# Patient Record
Sex: FEMALE | Race: WHITE | URBAN - METROPOLITAN AREA
[De-identification: names, ages, dates, MRNs, and addresses within clinical notes are randomized per-mention and may not be internally consistent; named-entity substitution may affect disease eponyms.]

---

## 2017-11-07 ENCOUNTER — HOSPITAL ENCOUNTER (OUTPATIENT)
Dept: ULTRASOUND IMAGING | Facility: CLINIC | Age: 67
Discharge: HOME | End: 2017-11-07
Attending: ORTHOPAEDIC SURGERY | Admitting: ORTHOPAEDIC SURGERY
Payer: MEDICARE

## 2017-11-07 DIAGNOSIS — M79.661 PAIN OF RIGHT LOWER LEG: ICD-10-CM

## 2017-11-07 PROCEDURE — 93971 EXTREMITY STUDY: CPT | Mod: RT

## 2024-12-30 ENCOUNTER — TRANSFERRED RECORDS (OUTPATIENT)
Dept: HEALTH INFORMATION MANAGEMENT | Facility: CLINIC | Age: 74
End: 2024-12-30

## 2025-04-07 ENCOUNTER — TRANSFERRED RECORDS (OUTPATIENT)
Dept: MULTI SPECIALTY CLINIC | Facility: CLINIC | Age: 75
End: 2025-04-07
Payer: MEDICARE

## 2025-04-07 LAB
CREATININE (EXTERNAL): 1.2 MG/DL (ref 0.5–1.5)
GLUCOSE (EXTERNAL): 97 MG/DL (ref 60–115)
POTASSIUM (EXTERNAL): 5 MMOL/L (ref 3.6–5.1)

## 2025-04-15 RX ORDER — GABAPENTIN 600 MG/1
600 TABLET ORAL 2 TIMES DAILY
COMMUNITY

## 2025-04-15 RX ORDER — LISINOPRIL 2.5 MG/1
2.5 TABLET ORAL DAILY
COMMUNITY

## 2025-04-15 RX ORDER — LETROZOLE 2.5 MG/1
2.5 TABLET, FILM COATED ORAL DAILY
COMMUNITY

## 2025-04-15 RX ORDER — SIMVASTATIN 20 MG
20 TABLET ORAL AT BEDTIME
COMMUNITY

## 2025-04-15 RX ORDER — ROPINIROLE 0.25 MG/1
0.25 TABLET, FILM COATED ORAL AT BEDTIME
COMMUNITY

## 2025-04-15 NOTE — PROVIDER NOTIFICATION
04/15/25 3615   Discharge Planning   Patient/Family Anticipates Transition to home   Concerns to be Addressed denies needs/concerns at this time   Living Arrangements   People in Home alone   Type of Residence Private Residence   Is your private residence a single family home or apartment? Apartment   Number of Stairs, Within Home, Primary none   Stair Railings, Within Home, Primary none   Once home, are you able to live on one level? Yes   Which level? Main Level   Bathroom Shower/Tub Walk-in shower   Equipment Currently Used at Home walker, rolling;grab bar, tub/shower;grab bar, toilet;cane, straight;shower chair   Support System   Support Systems Children   Do you have someone available to stay with you one or two nights once you are home? Yes   Blood   Known Bleeding Disorder or Coagulopathy No   Does the patient have any Yazidi/cultural preferences related to blood products? No   Education   Has the patient scheduled or completed pre-op total joint education, either in class or online, in the last 12 months? Yes   What day did the patient complete, or plan to complete, pre-op total joint education? 04/14/25   Patient attended total joint pre-op class/received pre-op teaching  online     Rachael Marte RN on 4/15/2025 at 3:58 PM

## 2025-05-06 ENCOUNTER — HOSPITAL ENCOUNTER (OUTPATIENT)
Facility: CLINIC | Age: 75
Discharge: HOME | End: 2025-05-07
Attending: ORTHOPAEDIC SURGERY | Admitting: ORTHOPAEDIC SURGERY
Payer: MEDICARE

## 2025-05-06 ENCOUNTER — APPOINTMENT (OUTPATIENT)
Dept: GENERAL RADIOLOGY | Facility: CLINIC | Age: 75
End: 2025-05-06
Attending: ORTHOPAEDIC SURGERY
Payer: MEDICARE

## 2025-05-06 ENCOUNTER — ANESTHESIA EVENT (OUTPATIENT)
Dept: SURGERY | Facility: CLINIC | Age: 75
End: 2025-05-06
Payer: MEDICARE

## 2025-05-06 ENCOUNTER — APPOINTMENT (OUTPATIENT)
Dept: PHYSICAL THERAPY | Facility: CLINIC | Age: 75
End: 2025-05-06
Attending: ORTHOPAEDIC SURGERY
Payer: MEDICARE

## 2025-05-06 ENCOUNTER — ANESTHESIA (OUTPATIENT)
Dept: SURGERY | Facility: CLINIC | Age: 75
End: 2025-05-06
Payer: MEDICARE

## 2025-05-06 DIAGNOSIS — Z96.652 S/P TKR (TOTAL KNEE REPLACEMENT), LEFT: Primary | ICD-10-CM

## 2025-05-06 DIAGNOSIS — M17.12 PRIMARY OSTEOARTHRITIS OF LEFT KNEE: ICD-10-CM

## 2025-05-06 PROCEDURE — 250N000009 HC RX 250: Performed by: ORTHOPAEDIC SURGERY

## 2025-05-06 PROCEDURE — 999N000141 HC STATISTIC PRE-PROCEDURE NURSING ASSESSMENT: Performed by: ORTHOPAEDIC SURGERY

## 2025-05-06 PROCEDURE — 272N000001 HC OR GENERAL SUPPLY STERILE: Performed by: ORTHOPAEDIC SURGERY

## 2025-05-06 PROCEDURE — 250N000009 HC RX 250: Performed by: NURSE ANESTHETIST, CERTIFIED REGISTERED

## 2025-05-06 PROCEDURE — 97116 GAIT TRAINING THERAPY: CPT | Mod: GP

## 2025-05-06 PROCEDURE — 258N000001 HC RX 258: Performed by: ORTHOPAEDIC SURGERY

## 2025-05-06 PROCEDURE — 258N000003 HC RX IP 258 OP 636: Performed by: PHYSICIAN ASSISTANT

## 2025-05-06 PROCEDURE — 258N000003 HC RX IP 258 OP 636: Performed by: NURSE ANESTHETIST, CERTIFIED REGISTERED

## 2025-05-06 PROCEDURE — 250N000013 HC RX MED GY IP 250 OP 250 PS 637: Performed by: PHYSICIAN ASSISTANT

## 2025-05-06 PROCEDURE — 370N000017 HC ANESTHESIA TECHNICAL FEE, PER MIN: Performed by: ORTHOPAEDIC SURGERY

## 2025-05-06 PROCEDURE — 250N000013 HC RX MED GY IP 250 OP 250 PS 637: Performed by: ORTHOPAEDIC SURGERY

## 2025-05-06 PROCEDURE — 360N000077 HC SURGERY LEVEL 4, PER MIN: Performed by: ORTHOPAEDIC SURGERY

## 2025-05-06 PROCEDURE — 710N000009 HC RECOVERY PHASE 1, LEVEL 1, PER MIN: Performed by: ORTHOPAEDIC SURGERY

## 2025-05-06 PROCEDURE — C1713 ANCHOR/SCREW BN/BN,TIS/BN: HCPCS | Performed by: ORTHOPAEDIC SURGERY

## 2025-05-06 PROCEDURE — C1776 JOINT DEVICE (IMPLANTABLE): HCPCS | Performed by: ORTHOPAEDIC SURGERY

## 2025-05-06 PROCEDURE — 250N000009 HC RX 250: Performed by: ANESTHESIOLOGY

## 2025-05-06 PROCEDURE — 97161 PT EVAL LOW COMPLEX 20 MIN: CPT | Mod: GP

## 2025-05-06 PROCEDURE — 250N000011 HC RX IP 250 OP 636: Performed by: NURSE ANESTHETIST, CERTIFIED REGISTERED

## 2025-05-06 PROCEDURE — 97110 THERAPEUTIC EXERCISES: CPT | Mod: GP

## 2025-05-06 PROCEDURE — 250N000011 HC RX IP 250 OP 636: Performed by: ANESTHESIOLOGY

## 2025-05-06 PROCEDURE — 120N000001 HC R&B MED SURG/OB

## 2025-05-06 PROCEDURE — 271N000001 HC OR GENERAL SUPPLY NON-STERILE: Performed by: ORTHOPAEDIC SURGERY

## 2025-05-06 PROCEDURE — 258N000003 HC RX IP 258 OP 636: Performed by: ORTHOPAEDIC SURGERY

## 2025-05-06 PROCEDURE — 250N000011 HC RX IP 250 OP 636: Performed by: ORTHOPAEDIC SURGERY

## 2025-05-06 PROCEDURE — 250N000011 HC RX IP 250 OP 636: Mod: JZ | Performed by: PHYSICIAN ASSISTANT

## 2025-05-06 PROCEDURE — 250N000011 HC RX IP 250 OP 636: Performed by: PHYSICIAN ASSISTANT

## 2025-05-06 PROCEDURE — 250N000025 HC SEVOFLURANE, PER MIN: Performed by: ORTHOPAEDIC SURGERY

## 2025-05-06 PROCEDURE — 99207 PR NO BILLABLE SERVICE THIS VISIT: CPT | Performed by: PHYSICIAN ASSISTANT

## 2025-05-06 PROCEDURE — 999N000065 XR KNEE PORT LEFT 1/2 VIEWS: Mod: LT

## 2025-05-06 DEVICE — FULL DOSE BONE CEMENT, 10 PACK CATALOG NUMBER IS 6191-1-010
Type: IMPLANTABLE DEVICE | Site: KNEE | Status: FUNCTIONAL
Brand: SIMPLEX

## 2025-05-06 DEVICE — PATELLA
Type: IMPLANTABLE DEVICE | Site: KNEE | Status: FUNCTIONAL
Brand: TRIATHLON

## 2025-05-06 DEVICE — UNIVERSAL TIBIAL BASEPLATE
Type: IMPLANTABLE DEVICE | Site: KNEE | Status: FUNCTIONAL
Brand: TRIATHLON

## 2025-05-06 DEVICE — POSTERIOR STABILIZED FEMORAL
Type: IMPLANTABLE DEVICE | Site: KNEE | Status: FUNCTIONAL
Brand: TRIATHLON

## 2025-05-06 DEVICE — TIBIAL BEARING INSERT - PS
Type: IMPLANTABLE DEVICE | Site: KNEE | Status: FUNCTIONAL
Brand: TRIATHLON

## 2025-05-06 RX ORDER — HYDRALAZINE HYDROCHLORIDE 20 MG/ML
INJECTION INTRAMUSCULAR; INTRAVENOUS PRN
Status: DISCONTINUED | OUTPATIENT
Start: 2025-05-06 | End: 2025-05-06

## 2025-05-06 RX ORDER — AMOXICILLIN 250 MG
1-2 CAPSULE ORAL 2 TIMES DAILY
Qty: 30 TABLET | Refills: 0 | Status: SHIPPED | OUTPATIENT
Start: 2025-05-06

## 2025-05-06 RX ORDER — ONDANSETRON 2 MG/ML
4 INJECTION INTRAMUSCULAR; INTRAVENOUS EVERY 30 MIN PRN
Status: DISCONTINUED | OUTPATIENT
Start: 2025-05-06 | End: 2025-05-06 | Stop reason: HOSPADM

## 2025-05-06 RX ORDER — ACETAMINOPHEN 325 MG/1
650 TABLET ORAL EVERY 4 HOURS PRN
Qty: 100 TABLET | Refills: 0 | Status: SHIPPED | OUTPATIENT
Start: 2025-05-06 | End: 2025-05-07

## 2025-05-06 RX ORDER — BISACODYL 10 MG
10 SUPPOSITORY, RECTAL RECTAL DAILY PRN
Status: DISCONTINUED | OUTPATIENT
Start: 2025-05-09 | End: 2025-05-07 | Stop reason: HOSPADM

## 2025-05-06 RX ORDER — CEFAZOLIN SODIUM/WATER 2 G/20 ML
2 SYRINGE (ML) INTRAVENOUS
Status: COMPLETED | OUTPATIENT
Start: 2025-05-06 | End: 2025-05-06

## 2025-05-06 RX ORDER — ROPINIROLE 0.25 MG/1
0.25 TABLET, FILM COATED ORAL AT BEDTIME
Status: DISCONTINUED | OUTPATIENT
Start: 2025-05-06 | End: 2025-05-07 | Stop reason: HOSPADM

## 2025-05-06 RX ORDER — LIDOCAINE HYDROCHLORIDE 20 MG/ML
INJECTION, SOLUTION INFILTRATION; PERINEURAL PRN
Status: DISCONTINUED | OUTPATIENT
Start: 2025-05-06 | End: 2025-05-06

## 2025-05-06 RX ORDER — CEFAZOLIN SODIUM/WATER 2 G/20 ML
2 SYRINGE (ML) INTRAVENOUS SEE ADMIN INSTRUCTIONS
Status: DISCONTINUED | OUTPATIENT
Start: 2025-05-06 | End: 2025-05-06 | Stop reason: HOSPADM

## 2025-05-06 RX ORDER — NALOXONE HYDROCHLORIDE 0.4 MG/ML
0.2 INJECTION, SOLUTION INTRAMUSCULAR; INTRAVENOUS; SUBCUTANEOUS
Status: DISCONTINUED | OUTPATIENT
Start: 2025-05-06 | End: 2025-05-07 | Stop reason: HOSPADM

## 2025-05-06 RX ORDER — SODIUM CHLORIDE, SODIUM LACTATE, POTASSIUM CHLORIDE, CALCIUM CHLORIDE 600; 310; 30; 20 MG/100ML; MG/100ML; MG/100ML; MG/100ML
INJECTION, SOLUTION INTRAVENOUS CONTINUOUS
Status: DISCONTINUED | OUTPATIENT
Start: 2025-05-06 | End: 2025-05-07 | Stop reason: HOSPADM

## 2025-05-06 RX ORDER — ONDANSETRON 2 MG/ML
4 INJECTION INTRAMUSCULAR; INTRAVENOUS EVERY 6 HOURS PRN
Status: DISCONTINUED | OUTPATIENT
Start: 2025-05-06 | End: 2025-05-07 | Stop reason: HOSPADM

## 2025-05-06 RX ORDER — ACETAMINOPHEN 325 MG/1
975 TABLET ORAL EVERY 8 HOURS
Status: DISCONTINUED | OUTPATIENT
Start: 2025-05-06 | End: 2025-05-07 | Stop reason: HOSPADM

## 2025-05-06 RX ORDER — TRIAMTERENE AND HYDROCHLOROTHIAZIDE 37.5; 25 MG/1; MG/1
1 TABLET ORAL DAILY
Status: DISCONTINUED | OUTPATIENT
Start: 2025-05-07 | End: 2025-05-07 | Stop reason: HOSPADM

## 2025-05-06 RX ORDER — HYDROMORPHONE HCL IN WATER/PF 6 MG/30 ML
0.4 PATIENT CONTROLLED ANALGESIA SYRINGE INTRAVENOUS EVERY 5 MIN PRN
Status: DISCONTINUED | OUTPATIENT
Start: 2025-05-06 | End: 2025-05-06 | Stop reason: HOSPADM

## 2025-05-06 RX ORDER — ALBUTEROL SULFATE 0.83 MG/ML
2.5 SOLUTION RESPIRATORY (INHALATION) EVERY 4 HOURS PRN
Status: DISCONTINUED | OUTPATIENT
Start: 2025-05-06 | End: 2025-05-06 | Stop reason: HOSPADM

## 2025-05-06 RX ORDER — METOPROLOL TARTRATE 1 MG/ML
INJECTION, SOLUTION INTRAVENOUS PRN
Status: DISCONTINUED | OUTPATIENT
Start: 2025-05-06 | End: 2025-05-06

## 2025-05-06 RX ORDER — ONDANSETRON 4 MG/1
4 TABLET, ORALLY DISINTEGRATING ORAL EVERY 30 MIN PRN
Status: DISCONTINUED | OUTPATIENT
Start: 2025-05-06 | End: 2025-05-06 | Stop reason: HOSPADM

## 2025-05-06 RX ORDER — LABETALOL HYDROCHLORIDE 5 MG/ML
10 INJECTION, SOLUTION INTRAVENOUS
Status: DISCONTINUED | OUTPATIENT
Start: 2025-05-06 | End: 2025-05-06 | Stop reason: HOSPADM

## 2025-05-06 RX ORDER — GABAPENTIN 600 MG/1
600 TABLET ORAL 2 TIMES DAILY
Status: DISCONTINUED | OUTPATIENT
Start: 2025-05-06 | End: 2025-05-07 | Stop reason: HOSPADM

## 2025-05-06 RX ORDER — HYDRALAZINE HYDROCHLORIDE 20 MG/ML
2.5-5 INJECTION INTRAMUSCULAR; INTRAVENOUS EVERY 10 MIN PRN
Status: DISCONTINUED | OUTPATIENT
Start: 2025-05-06 | End: 2025-05-06 | Stop reason: HOSPADM

## 2025-05-06 RX ORDER — ONDANSETRON 4 MG/1
4 TABLET, ORALLY DISINTEGRATING ORAL EVERY 6 HOURS PRN
Status: DISCONTINUED | OUTPATIENT
Start: 2025-05-06 | End: 2025-05-07 | Stop reason: HOSPADM

## 2025-05-06 RX ORDER — BUPIVACAINE HYDROCHLORIDE AND EPINEPHRINE 2.5; 5 MG/ML; UG/ML
INJECTION, SOLUTION INFILTRATION; PERINEURAL
Status: COMPLETED | OUTPATIENT
Start: 2025-05-06 | End: 2025-05-06

## 2025-05-06 RX ORDER — PROPOFOL 10 MG/ML
INJECTION, EMULSION INTRAVENOUS CONTINUOUS PRN
Status: DISCONTINUED | OUTPATIENT
Start: 2025-05-06 | End: 2025-05-06

## 2025-05-06 RX ORDER — CELECOXIB 200 MG/1
200 CAPSULE ORAL DAILY
Qty: 14 CAPSULE | Refills: 0 | Status: SHIPPED | OUTPATIENT
Start: 2025-05-06 | End: 2025-05-06

## 2025-05-06 RX ORDER — SIMVASTATIN 10 MG
20 TABLET ORAL AT BEDTIME
Status: DISCONTINUED | OUTPATIENT
Start: 2025-05-06 | End: 2025-05-07 | Stop reason: HOSPADM

## 2025-05-06 RX ORDER — SODIUM CHLORIDE, SODIUM LACTATE, POTASSIUM CHLORIDE, CALCIUM CHLORIDE 600; 310; 30; 20 MG/100ML; MG/100ML; MG/100ML; MG/100ML
INJECTION, SOLUTION INTRAVENOUS CONTINUOUS
Status: DISCONTINUED | OUTPATIENT
Start: 2025-05-06 | End: 2025-05-06 | Stop reason: HOSPADM

## 2025-05-06 RX ORDER — MEPERIDINE HYDROCHLORIDE 25 MG/ML
12.5 INJECTION INTRAMUSCULAR; INTRAVENOUS; SUBCUTANEOUS EVERY 5 MIN PRN
Status: DISCONTINUED | OUTPATIENT
Start: 2025-05-06 | End: 2025-05-06 | Stop reason: HOSPADM

## 2025-05-06 RX ORDER — LETROZOLE 2.5 MG/1
2.5 TABLET, FILM COATED ORAL AT BEDTIME
Status: DISCONTINUED | OUTPATIENT
Start: 2025-05-06 | End: 2025-05-07 | Stop reason: HOSPADM

## 2025-05-06 RX ORDER — CELECOXIB 100 MG/1
100 CAPSULE ORAL 2 TIMES DAILY
Status: DISCONTINUED | OUTPATIENT
Start: 2025-05-06 | End: 2025-05-07 | Stop reason: HOSPADM

## 2025-05-06 RX ORDER — HYDROXYZINE HYDROCHLORIDE 10 MG/1
10 TABLET, FILM COATED ORAL EVERY 6 HOURS PRN
Qty: 30 TABLET | Refills: 0 | Status: SHIPPED | OUTPATIENT
Start: 2025-05-06

## 2025-05-06 RX ORDER — HYDROMORPHONE HCL IN WATER/PF 6 MG/30 ML
0.1 PATIENT CONTROLLED ANALGESIA SYRINGE INTRAVENOUS EVERY 4 HOURS PRN
Status: DISCONTINUED | OUTPATIENT
Start: 2025-05-06 | End: 2025-05-07 | Stop reason: HOSPADM

## 2025-05-06 RX ORDER — OXYCODONE HYDROCHLORIDE 5 MG/1
5-10 TABLET ORAL EVERY 4 HOURS PRN
Qty: 33 TABLET | Refills: 0 | Status: SHIPPED | OUTPATIENT
Start: 2025-05-06

## 2025-05-06 RX ORDER — OXYCODONE HYDROCHLORIDE 5 MG/1
5 TABLET ORAL
Status: DISCONTINUED | OUTPATIENT
Start: 2025-05-06 | End: 2025-05-06 | Stop reason: HOSPADM

## 2025-05-06 RX ORDER — ONDANSETRON 2 MG/ML
INJECTION INTRAMUSCULAR; INTRAVENOUS PRN
Status: DISCONTINUED | OUTPATIENT
Start: 2025-05-06 | End: 2025-05-06

## 2025-05-06 RX ORDER — SODIUM CHLORIDE, SODIUM LACTATE, POTASSIUM CHLORIDE, CALCIUM CHLORIDE 600; 310; 30; 20 MG/100ML; MG/100ML; MG/100ML; MG/100ML
INJECTION, SOLUTION INTRAVENOUS CONTINUOUS PRN
Status: DISCONTINUED | OUTPATIENT
Start: 2025-05-06 | End: 2025-05-06

## 2025-05-06 RX ORDER — NALOXONE HYDROCHLORIDE 0.4 MG/ML
0.1 INJECTION, SOLUTION INTRAMUSCULAR; INTRAVENOUS; SUBCUTANEOUS
Status: DISCONTINUED | OUTPATIENT
Start: 2025-05-06 | End: 2025-05-06 | Stop reason: HOSPADM

## 2025-05-06 RX ORDER — OXYCODONE HYDROCHLORIDE 5 MG/1
5 TABLET ORAL EVERY 4 HOURS PRN
Status: DISCONTINUED | OUTPATIENT
Start: 2025-05-06 | End: 2025-05-07 | Stop reason: HOSPADM

## 2025-05-06 RX ORDER — FENTANYL CITRATE 50 UG/ML
25 INJECTION, SOLUTION INTRAMUSCULAR; INTRAVENOUS EVERY 5 MIN PRN
Status: DISCONTINUED | OUTPATIENT
Start: 2025-05-06 | End: 2025-05-06 | Stop reason: HOSPADM

## 2025-05-06 RX ORDER — DEXAMETHASONE SODIUM PHOSPHATE 4 MG/ML
4 INJECTION, SOLUTION INTRA-ARTICULAR; INTRALESIONAL; INTRAMUSCULAR; INTRAVENOUS; SOFT TISSUE
Status: DISCONTINUED | OUTPATIENT
Start: 2025-05-06 | End: 2025-05-06 | Stop reason: HOSPADM

## 2025-05-06 RX ORDER — HYDROMORPHONE HCL IN WATER/PF 6 MG/30 ML
0.2 PATIENT CONTROLLED ANALGESIA SYRINGE INTRAVENOUS EVERY 5 MIN PRN
Status: DISCONTINUED | OUTPATIENT
Start: 2025-05-06 | End: 2025-05-06 | Stop reason: HOSPADM

## 2025-05-06 RX ORDER — CEFAZOLIN SODIUM 2 G/50ML
2 SOLUTION INTRAVENOUS EVERY 8 HOURS
Status: COMPLETED | OUTPATIENT
Start: 2025-05-06 | End: 2025-05-07

## 2025-05-06 RX ORDER — AMOXICILLIN 250 MG
1 CAPSULE ORAL 2 TIMES DAILY
Status: DISCONTINUED | OUTPATIENT
Start: 2025-05-06 | End: 2025-05-07 | Stop reason: HOSPADM

## 2025-05-06 RX ORDER — HYDROMORPHONE HCL IN WATER/PF 6 MG/30 ML
0.2 PATIENT CONTROLLED ANALGESIA SYRINGE INTRAVENOUS EVERY 4 HOURS PRN
Status: DISCONTINUED | OUTPATIENT
Start: 2025-05-06 | End: 2025-05-07 | Stop reason: HOSPADM

## 2025-05-06 RX ORDER — FAMOTIDINE 20 MG/1
20 TABLET, FILM COATED ORAL 2 TIMES DAILY
Status: DISCONTINUED | OUTPATIENT
Start: 2025-05-06 | End: 2025-05-07

## 2025-05-06 RX ORDER — PROPOFOL 10 MG/ML
INJECTION, EMULSION INTRAVENOUS PRN
Status: DISCONTINUED | OUTPATIENT
Start: 2025-05-06 | End: 2025-05-06

## 2025-05-06 RX ORDER — NALOXONE HYDROCHLORIDE 0.4 MG/ML
0.4 INJECTION, SOLUTION INTRAMUSCULAR; INTRAVENOUS; SUBCUTANEOUS
Status: DISCONTINUED | OUTPATIENT
Start: 2025-05-06 | End: 2025-05-07 | Stop reason: HOSPADM

## 2025-05-06 RX ORDER — TRANEXAMIC ACID 650 MG/1
1950 TABLET ORAL ONCE
Status: COMPLETED | OUTPATIENT
Start: 2025-05-06 | End: 2025-05-06

## 2025-05-06 RX ORDER — HYDROXYZINE HYDROCHLORIDE 10 MG/1
10 TABLET, FILM COATED ORAL EVERY 6 HOURS PRN
Status: DISCONTINUED | OUTPATIENT
Start: 2025-05-06 | End: 2025-05-07 | Stop reason: HOSPADM

## 2025-05-06 RX ORDER — VANCOMYCIN HYDROCHLORIDE 1 G/20ML
INJECTION, POWDER, LYOPHILIZED, FOR SOLUTION INTRAVENOUS PRN
Status: DISCONTINUED | OUTPATIENT
Start: 2025-05-06 | End: 2025-05-06 | Stop reason: HOSPADM

## 2025-05-06 RX ORDER — OXYCODONE HYDROCHLORIDE 5 MG/1
10 TABLET ORAL
Status: DISCONTINUED | OUTPATIENT
Start: 2025-05-06 | End: 2025-05-06 | Stop reason: HOSPADM

## 2025-05-06 RX ORDER — FENTANYL CITRATE 50 UG/ML
INJECTION, SOLUTION INTRAMUSCULAR; INTRAVENOUS PRN
Status: DISCONTINUED | OUTPATIENT
Start: 2025-05-06 | End: 2025-05-06

## 2025-05-06 RX ORDER — LISINOPRIL 2.5 MG/1
2.5 TABLET ORAL DAILY
Status: DISCONTINUED | OUTPATIENT
Start: 2025-05-07 | End: 2025-05-07 | Stop reason: HOSPADM

## 2025-05-06 RX ORDER — SENNOSIDES 8.6 MG
325 CAPSULE ORAL DAILY
Status: DISCONTINUED | OUTPATIENT
Start: 2025-05-06 | End: 2025-05-07 | Stop reason: HOSPADM

## 2025-05-06 RX ORDER — ONDANSETRON 4 MG/1
4 TABLET, ORALLY DISINTEGRATING ORAL EVERY 8 HOURS PRN
Qty: 10 TABLET | Refills: 0 | Status: SHIPPED | OUTPATIENT
Start: 2025-05-06

## 2025-05-06 RX ORDER — ACETAMINOPHEN 325 MG/1
975 TABLET ORAL ONCE
Status: DISCONTINUED | OUTPATIENT
Start: 2025-05-06 | End: 2025-05-06 | Stop reason: HOSPADM

## 2025-05-06 RX ORDER — SENNOSIDES 8.6 MG
325 CAPSULE ORAL DAILY
Qty: 30 TABLET | Refills: 0 | Status: SHIPPED | OUTPATIENT
Start: 2025-05-06 | End: 2025-05-07

## 2025-05-06 RX ORDER — PROCHLORPERAZINE MALEATE 5 MG/1
5 TABLET ORAL EVERY 6 HOURS PRN
Status: DISCONTINUED | OUTPATIENT
Start: 2025-05-06 | End: 2025-05-07 | Stop reason: HOSPADM

## 2025-05-06 RX ORDER — DEXAMETHASONE SODIUM PHOSPHATE 4 MG/ML
INJECTION, SOLUTION INTRA-ARTICULAR; INTRALESIONAL; INTRAMUSCULAR; INTRAVENOUS; SOFT TISSUE PRN
Status: DISCONTINUED | OUTPATIENT
Start: 2025-05-06 | End: 2025-05-06

## 2025-05-06 RX ORDER — LIDOCAINE 40 MG/G
CREAM TOPICAL
Status: DISCONTINUED | OUTPATIENT
Start: 2025-05-06 | End: 2025-05-07 | Stop reason: HOSPADM

## 2025-05-06 RX ORDER — HYDROXYZINE HYDROCHLORIDE 10 MG/1
10 TABLET, FILM COATED ORAL EVERY 6 HOURS PRN
Status: DISCONTINUED | OUTPATIENT
Start: 2025-05-06 | End: 2025-05-06 | Stop reason: HOSPADM

## 2025-05-06 RX ORDER — LIDOCAINE 40 MG/G
CREAM TOPICAL
Status: DISCONTINUED | OUTPATIENT
Start: 2025-05-06 | End: 2025-05-06 | Stop reason: HOSPADM

## 2025-05-06 RX ORDER — POLYETHYLENE GLYCOL 3350 17 G/17G
17 POWDER, FOR SOLUTION ORAL DAILY
Status: DISCONTINUED | OUTPATIENT
Start: 2025-05-07 | End: 2025-05-07 | Stop reason: HOSPADM

## 2025-05-06 RX ORDER — FENTANYL CITRATE 50 UG/ML
50 INJECTION, SOLUTION INTRAMUSCULAR; INTRAVENOUS EVERY 5 MIN PRN
Status: DISCONTINUED | OUTPATIENT
Start: 2025-05-06 | End: 2025-05-06 | Stop reason: HOSPADM

## 2025-05-06 RX ADMIN — SODIUM CHLORIDE, SODIUM LACTATE, POTASSIUM CHLORIDE, AND CALCIUM CHLORIDE: .6; .31; .03; .02 INJECTION, SOLUTION INTRAVENOUS at 07:15

## 2025-05-06 RX ADMIN — SENNOSIDES AND DOCUSATE SODIUM 1 TABLET: 50; 8.6 TABLET ORAL at 16:05

## 2025-05-06 RX ADMIN — PROPOFOL 50 MCG/KG/MIN: 10 INJECTION, EMULSION INTRAVENOUS at 08:01

## 2025-05-06 RX ADMIN — HYDRALAZINE HYDROCHLORIDE 2 MG: 20 INJECTION INTRAMUSCULAR; INTRAVENOUS at 08:40

## 2025-05-06 RX ADMIN — OXYCODONE HYDROCHLORIDE 5 MG: 5 TABLET ORAL at 18:20

## 2025-05-06 RX ADMIN — FAMOTIDINE 20 MG: 20 TABLET, FILM COATED ORAL at 12:58

## 2025-05-06 RX ADMIN — FENTANYL CITRATE 100 MCG: 50 INJECTION INTRAMUSCULAR; INTRAVENOUS at 07:45

## 2025-05-06 RX ADMIN — DEXAMETHASONE SODIUM PHOSPHATE 4 MG: 4 INJECTION, SOLUTION INTRA-ARTICULAR; INTRALESIONAL; INTRAMUSCULAR; INTRAVENOUS; SOFT TISSUE at 07:59

## 2025-05-06 RX ADMIN — HYDROMORPHONE HYDROCHLORIDE 0.5 MG: 1 INJECTION, SOLUTION INTRAMUSCULAR; INTRAVENOUS; SUBCUTANEOUS at 08:12

## 2025-05-06 RX ADMIN — HYDROMORPHONE HYDROCHLORIDE 0.5 MG: 1 INJECTION, SOLUTION INTRAMUSCULAR; INTRAVENOUS; SUBCUTANEOUS at 07:54

## 2025-05-06 RX ADMIN — FAMOTIDINE 20 MG: 20 TABLET, FILM COATED ORAL at 21:04

## 2025-05-06 RX ADMIN — ACETAMINOPHEN 975 MG: 325 TABLET, FILM COATED ORAL at 21:04

## 2025-05-06 RX ADMIN — ROPINIROLE HYDROCHLORIDE 0.25 MG: 0.25 TABLET, FILM COATED ORAL at 21:04

## 2025-05-06 RX ADMIN — OXYCODONE HYDROCHLORIDE 5 MG: 5 TABLET ORAL at 13:00

## 2025-05-06 RX ADMIN — MIDAZOLAM 1 MG: 1 INJECTION INTRAMUSCULAR; INTRAVENOUS at 07:24

## 2025-05-06 RX ADMIN — GABAPENTIN 600 MG: 600 TABLET, FILM COATED ORAL at 21:04

## 2025-05-06 RX ADMIN — SIMVASTATIN 20 MG: 10 TABLET, FILM COATED ORAL at 21:04

## 2025-05-06 RX ADMIN — ACETAMINOPHEN 975 MG: 325 TABLET, FILM COATED ORAL at 13:00

## 2025-05-06 RX ADMIN — SODIUM CHLORIDE, SODIUM LACTATE, POTASSIUM CHLORIDE, AND CALCIUM CHLORIDE: .6; .31; .03; .02 INJECTION, SOLUTION INTRAVENOUS at 21:03

## 2025-05-06 RX ADMIN — TRANEXAMIC ACID 1950 MG: 650 TABLET ORAL at 05:56

## 2025-05-06 RX ADMIN — METOPROLOL TARTRATE 1 MG: 5 INJECTION INTRAVENOUS at 10:05

## 2025-05-06 RX ADMIN — Medication 2 G: at 07:39

## 2025-05-06 RX ADMIN — SENNOSIDES AND DOCUSATE SODIUM 1 TABLET: 50; 8.6 TABLET ORAL at 21:06

## 2025-05-06 RX ADMIN — PROPOFOL 50 MG: 10 INJECTION, EMULSION INTRAVENOUS at 08:20

## 2025-05-06 RX ADMIN — LETROZOLE 2.5 MG: 2.5 TABLET ORAL at 21:05

## 2025-05-06 RX ADMIN — SODIUM CHLORIDE, SODIUM LACTATE, POTASSIUM CHLORIDE, AND CALCIUM CHLORIDE: .6; .31; .03; .02 INJECTION, SOLUTION INTRAVENOUS at 09:47

## 2025-05-06 RX ADMIN — HYDRALAZINE HYDROCHLORIDE 4 MG: 20 INJECTION INTRAMUSCULAR; INTRAVENOUS at 08:29

## 2025-05-06 RX ADMIN — HYDROMORPHONE HYDROCHLORIDE 0.5 MG: 1 INJECTION, SOLUTION INTRAMUSCULAR; INTRAVENOUS; SUBCUTANEOUS at 08:29

## 2025-05-06 RX ADMIN — HYDROMORPHONE HYDROCHLORIDE 0.5 MG: 1 INJECTION, SOLUTION INTRAMUSCULAR; INTRAVENOUS; SUBCUTANEOUS at 08:21

## 2025-05-06 RX ADMIN — PROPOFOL 200 MG: 10 INJECTION, EMULSION INTRAVENOUS at 07:59

## 2025-05-06 RX ADMIN — HYDROXYZINE HYDROCHLORIDE 10 MG: 10 TABLET ORAL at 12:58

## 2025-05-06 RX ADMIN — METOPROLOL TARTRATE 1 MG: 5 INJECTION INTRAVENOUS at 09:23

## 2025-05-06 RX ADMIN — ASPIRIN 325 MG: 325 TABLET, COATED ORAL at 12:58

## 2025-05-06 RX ADMIN — LIDOCAINE HYDROCHLORIDE 50 MG: 20 INJECTION, SOLUTION INFILTRATION; PERINEURAL at 07:59

## 2025-05-06 RX ADMIN — ONDANSETRON 4 MG: 2 INJECTION INTRAMUSCULAR; INTRAVENOUS at 08:17

## 2025-05-06 RX ADMIN — BUPIVACAINE HYDROCHLORIDE AND EPINEPHRINE BITARTRATE 15 ML: 2.5; .005 INJECTION, SOLUTION INFILTRATION; PERINEURAL at 07:24

## 2025-05-06 RX ADMIN — CEFAZOLIN SODIUM 2 G: 2 SOLUTION INTRAVENOUS at 16:05

## 2025-05-06 ASSESSMENT — ACTIVITIES OF DAILY LIVING (ADL)
ADLS_ACUITY_SCORE: 33
ADLS_ACUITY_SCORE: 33
ADLS_ACUITY_SCORE: 32
ADLS_ACUITY_SCORE: 33
ADLS_ACUITY_SCORE: 30
ADLS_ACUITY_SCORE: 33

## 2025-05-06 ASSESSMENT — COLUMBIA-SUICIDE SEVERITY RATING SCALE - C-SSRS
1. IN THE PAST MONTH, HAVE YOU WISHED YOU WERE DEAD OR WISHED YOU COULD GO TO SLEEP AND NOT WAKE UP?: NO
2. HAVE YOU ACTUALLY HAD ANY THOUGHTS OF KILLING YOURSELF IN THE PAST MONTH?: NO
6. HAVE YOU EVER DONE ANYTHING, STARTED TO DO ANYTHING, OR PREPARED TO DO ANYTHING TO END YOUR LIFE?: NO

## 2025-05-06 NOTE — ANESTHESIA PREPROCEDURE EVALUATION
Anesthesia Pre-Procedure Evaluation    Patient: Dafne Choi   MRN: [id number] : 1950        Procedure : Procedure(s):  Left total knee arthroplasty          Past Medical History:   Diagnosis Date    Hypertension     Obese     Osteoarthritis     knees ,thumb joint    Osteopenia       Past Surgical History:   Procedure Laterality Date    ARTHROPLASTY KNEE Right 2016    Procedure: ARTHROPLASTY KNEE;  Surgeon: Rolando Pastor MD;  Location: [facility] OR    GASTRIC BYPASS        Allergies   Allergen Reactions    Aluminum Swelling and Blisters    Diphtheria Toxoid     Chondroitin Sulfate A Swelling     Eye swelling    Glucosamine Other (See Comments)     Eyelids swelled, itched and scaled    Methylsulfonylmethane Unknown    Pneumococcal Vaccine Hives and Swelling    Green Dye Hives and Rash      Social History     Tobacco Use    Smoking status: Never    Smokeless tobacco: Never   Substance Use Topics    Alcohol use: No      Wt Readings from Last 1 Encounters:   25 111.2 kg (245 lb 3.2 oz)        Anesthesia Evaluation            ROS/MED HX  ENT/Pulmonary:     (+)     DAVID risk factors,  hypertension, obese,                                Neurologic:       Cardiovascular:     (+)  hypertension- -   -  - -                                      METS/Exercise Tolerance:     Hematologic:       Musculoskeletal:       GI/Hepatic:       Renal/Genitourinary:       Endo:     (+)               Obesity,       Psychiatric/Substance Use:       Infectious Disease:       Malignancy:       Other:            Physical Exam    Airway        Mallampati: II   TM distance: > 3 FB   Neck ROM: full   Mouth opening: > 3 cm    Respiratory Devices and Support         Dental       (+) Minor Abnormalities - some fillings, tiny chips      Cardiovascular          Rhythm and rate: regular     Pulmonary           breath sounds clear to auscultation           OUTSIDE LABS:  CBC:   Lab Results   Component Value Date    HGB 11.9 2016  "   HGB 11.6 (L) 11/09/2016     BMP:   Lab Results   Component Value Date     11/09/2016    POTASSIUM 4.2 11/09/2016    CHLORIDE 100 11/09/2016    CO2 33 (H) 11/09/2016    BUN 13 11/09/2016    CR 0.93 11/09/2016     (H) 11/09/2016     (H) 11/08/2016     COAGS: No results found for: \"PTT\", \"INR\", \"FIBR\"  POC:   Lab Results   Component Value Date    BGM 97 11/10/2016     HEPATIC: No results found for: \"ALBUMIN\", \"PROTTOTAL\", \"ALT\", \"AST\", \"GGT\", \"ALKPHOS\", \"BILITOTAL\", \"BILIDIRECT\", \"NINA\"  OTHER:   Lab Results   Component Value Date    CESIA 8.1 (L) 11/09/2016       Anesthesia Plan    ASA Status:  3    NPO Status:  NPO Appropriate    Anesthesia Type: General.     - Airway: LMA   Induction: Intravenous, Propofol.   Maintenance: Balanced.        Consents    Anesthesia Plan(s) and associated risks, benefits, and realistic alternatives discussed. Questions answered and patient/representative(s) expressed understanding.     - Discussed:     - Discussed with:  Patient            Postoperative Care    Pain management: IV analgesics, Peripheral nerve block (Single Shot).   PONV prophylaxis: Ondansetron (or other 5HT-3), Dexamethasone or Solumedrol, Background Propofol Infusion     Comments:               Gina Beltre MD    Clinically Significant Risk Factors Present on Admission                   # Hypertension: Noted on problem list           # Morbid Obesity: Estimated body mass index is 46.35 kg/m  as calculated from the following:    Height as of this encounter: 1.549 m (5' 0.98\").    Weight as of this encounter: 111.2 kg (245 lb 3.2 oz).                "

## 2025-05-06 NOTE — PROGRESS NOTES
Patient vital signs are at baseline: Yes  Patient able to ambulate as they were prior to admission or with assist devices provided by therapies during their stay:  No,  Reason:  assist of 1 walker gait belt  Patient MUST void prior to discharge:  Yes  Patient able to tolerate oral intake:  Yes  Pain has adequate pain control using Oral analgesics:  Yes  Does patient have an identified :  Yes  Has goal D/C date and time been discussed with patient:  Yes     Patient alert and oriented. Very lethargic/drowsy for hours post surgery. Up assist of 1 walker gait belt. Pain managed with oral medications. Voiding with minimal PVR. CMS in tact. Dressing CDI. Plan to discharge home tomorrow

## 2025-05-06 NOTE — ANESTHESIA POSTPROCEDURE EVALUATION
Patient: Dafne Choi    Procedure: Procedure(s):  Left total knee arthroplasty       Anesthesia Type:  General    Note:  Disposition: Inpatient   Postop Pain Control: Uneventful            Sign Out: Well controlled pain   PONV: No   Neuro/Psych: Uneventful            Sign Out: Acceptable/Baseline neuro status   Airway/Respiratory: Uneventful            Sign Out: Acceptable/Baseline resp. status   CV/Hemodynamics: Uneventful            Sign Out: Acceptable CV status; No obvious hypovolemia; No obvious fluid overload   Other NRE: NONE   DID A NON-ROUTINE EVENT OCCUR?            Last vitals:  Vitals Value Taken Time   /81 05/06/25 1120   Temp 97.52  F (36.4  C) 05/06/25 1121   Pulse 102 05/06/25 1122   Resp 20 05/06/25 1122   SpO2 95 % 05/06/25 1126   Vitals shown include unfiled device data.    Electronically Signed By: Gina Beltre MD  May 6, 2025  12:31 PM

## 2025-05-06 NOTE — ANESTHESIA PROCEDURE NOTES
"Adductor canal Procedure Note    Pre-Procedure   Staff -        Anesthesiologist:  Gina Beltre MD       Performed By: anesthesiologist       Location: pre-op       Procedure Start/Stop Times: 5/6/2025 7:24 AM and 5/6/2025 7:35 AM       Pre-Anesthestic Checklist: patient identified, IV checked, site marked, risks and benefits discussed, informed consent, monitors and equipment checked, pre-op evaluation, at physician/surgeon's request and post-op pain management  Timeout:       Correct Patient: Yes        Correct Procedure: Yes        Correct Site: Yes        Correct Position: Yes        Correct Laterality: Yes        Site Marked: Yes  Procedure Documentation  Procedure: Adductor canal         Laterality: left       Patient Position: supine       Patient Prep/Sterile Barriers: sterile gloves, mask       Skin prep: Chloraprep       Needle Gauge: 21.        Needle Length (Inches): 4        Ultrasound guided       1. Ultrasound was used to identify targeted nerve, plexus, vascular marker, or fascial plane and place a needle adjacent to it in real-time.       2. Ultrasound was used to visualize the spread of anesthetic in close proximity to the above referenced structure.    Assessment/Narrative         The placement was negative for: blood aspirated, painful injection and site bleeding       Paresthesias: No.       Bolus given via needle. no blood aspirated via catheter.        Secured via.        Insertion/Infusion Method: Single Shot       Complications: none    Medication(s) Administered   Bupivacaine 0.25% w/ 1:200K Epi (Injection) - Injection   15 mL - 5/6/2025 7:24:00 AM  Medication Administration Time: 5/6/2025 7:24 AM     Comments:  Pt able to maintain a meaningful conversation throughout.      FOR [facility] ([facility]) ONLY:   Pain Team Contact information: please page the Pain Team Via [employer]. Search \"Pain\". During daytime hours, please page the attending first. At night please page the resident " first.

## 2025-05-06 NOTE — ANESTHESIA PROCEDURE NOTES
Airway       Patient location during procedure: OR  Staff -        Anesthesiologist:  Gina Beltre MD       CRNA: Afsaneh Handley APRN CRNA       Performed By: CRNA  Consent for Airway        Urgency: elective  Indications and Patient Condition       Indications for airway management: lisa-procedural       Induction type:intravenous       Mask difficulty assessment: 1 - vent by mask    Final Airway Details       Final airway type: supraglottic airway    Supraglottic Airway Details        Type: LMA       Brand: I-Gel       LMA size: 4    Post intubation assessment        Placement verified by: capnometry, equal breath sounds and chest rise        Number of attempts at approach: 1       Number of other approaches attempted: 0       Secured with: commercial tube agudelo       Ease of procedure: easy       Dentition: Unchanged

## 2025-05-06 NOTE — PHARMACY-ADMISSION MEDICATION HISTORY
Medication reconciliation completed by pre-admitting.    PTA Med List   Medication Sig Last Dose/Taking    Acetaminophen (TYLENOL PO) Take 500 mg by mouth 2 times daily Give with each scheduled oxycodone dose 5/5/2025 Bedtime    acetaminophen (TYLENOL) 325 MG tablet Take 2 tablets (650 mg) by mouth every 4 hours as needed for other (mild pain). Taking As Needed    aspirin (ASA) 325 MG EC tablet Take 1 tablet (325 mg) by mouth daily. Taking    calcium-vitamin D (CALTRATE) 600-400 MG-UNIT per tablet Take 2 tablets by mouth 2 times daily 4/29/2025    celecoxib (CELEBREX) 200 MG capsule Take 1 capsule (200 mg) by mouth daily for 14 days. Do not take within 6 hours of ibuprofen (MOTRIN, ADVIL) or ketorolac (TORADOL) if prescribed. Taking    gabapentin (NEURONTIN) 600 MG tablet Take 600 mg by mouth 2 times daily. 5/5/2025 Bedtime    hydrOXYzine HCl (ATARAX) 10 MG tablet Take 1 tablet (10 mg) by mouth every 6 hours as needed for itching or anxiety (with pain, moderate pain). Taking As Needed    letrozole (FEMARA) 2.5 MG tablet Take 2.5 mg by mouth daily. 5/5/2025 Bedtime    lisinopril (ZESTRIL) 2.5 MG tablet Take 2.5 mg by mouth daily. 5/5/2025 Morning    loratadine (CLARITIN) 10 MG tablet Take 10 mg by mouth daily 4/29/2025    melatonin 1 MG TABS Take 1 tablet (1 mg) by mouth nightly as needed for sleep 4/29/2025    multivitamin, therapeutic with minerals (MULTI-VITAMIN) TABS Take 1 tablet by mouth daily 4/29/2025    ondansetron (ZOFRAN ODT) 4 MG ODT tab Take 1 tablet (4 mg) by mouth every 8 hours as needed for nausea. Dissolve ON the tongue. Taking As Needed    oxyCODONE (ROXICODONE) 5 MG tablet Take 1-2 tablets (5-10 mg) by mouth every 4 hours as needed for moderate to severe pain. Taking As Needed    rOPINIRole (REQUIP) 0.25 MG tablet Take 0.25 mg by mouth at bedtime. 5/5/2025 Bedtime    senna-docusate (SENOKOT-S/PERICOLACE) 8.6-50 MG tablet Take 1-2 tablets by mouth 2 times daily. Take while on oral narcotics to  prevent or treat constipation. Taking    simvastatin (ZOCOR) 20 MG tablet Take 20 mg by mouth at bedtime. 5/5/2025 Bedtime    triamterene-hydrochlorothiazide (MAXZIDE-25) 37.5-25 MG per tablet Take 1 tablet by mouth daily 5/6/2025 at  5:00 AM

## 2025-05-06 NOTE — OP NOTE
[facility]   Operative Note    Pre-operative diagnosis: Osteoarthritis of left knee [M17.12]   Post-operative diagnosis * No post-op diagnosis entered *  same   Procedure: Procedure(s):  Left total knee arthroplasty   Surgeon(s):  Assistant:  Anesthesia: Surgeons and Role:     * Rolando Pastor MD - Primary     * Jacky Arriola PA-C - Assisting  Jacky Arriola PA-C  General with Block   Estimated blood loss: 100 mL    Specimens: * No specimens in log *   Findings: Grade 4 DJD           INDICATIONS FOR PROCEDURE:  The patient is a 74 year old female with severe osteoarthritis with primarily grade 4 changes in the knee joint with some lesser changes in the medial and lateral compartments of their knee.  The patient attempted conservative management including injections, medications and therapy which failed to relieve their symptoms adequately.  Based on poor quality of life and severe pain, the patient elected for a left total knee arthroplasty.  Risks, benefits and alternatives were reviewed with the patient in the clinic prior to the procedure today and the consent was signed today.     DESCRIPTION OF PROCEDURE:  The patient was brought to the operating room.  general anesthetic was administered after a block was established in the PACU.  The left lower extremity was then prepped and draped in the usual sterile fashion for total knee arthroplasty.  After a timeout confirmed the appropriate limb, the limb was exsanguinated and standard midline incision was made with sharp dissection down to the patella.  Medial parapatellar arthrotomy was performed.  Patella was easily everted.  A small medial release was performed.  Some of the fat pad was excised and the proximal bursal tissue was excised as well.  The knee was easily flexed up and again she had severe grade 4 changes in the knee joint.  We used our opening reamer to open up the femoral canal, used our flex juan system at 5 degrees  valgus to take 8 off distally. This was pinned in position and our distal cut was made.  We were pleased.   We turned our attention back to the distal femur.  We sized the patient to a size 3, used our posterior referencing and then we used our 4-in-1 cutting jig after step block, made our anterior, posterior and chamfer cuts based on the epicondylar access.  With that completed, we removed excess bone from the knee, removed some osteophytes and turned our attention to the tibia, used our extramedullary tibia guide to make a nice flat perpendicular cut.  We took roughly 2 off medially and the patient still had some areas of thickened cartilage.  This was taking roughly 8 off laterally, again consistent with our templating.  A nice flat perpendicular cut was made.  With that completed, we removed this fragment of bone.  We then used our gap spacer and noted we would be somewhere probably around a 13 poly, but we seemed balanced in both flexion and extension, so we were pleased with this.  We then used our cutting block for the size 4 PS knee.  This was pinned into position.  We cut the box and released the rest of the PCL.  We then removed some posterior osteophytes and meniscus tissue.  Formal trial was done at this point, a 14 poly gave us good fit.  We cut the patella to accept a 32 asymmetric patella, cutting roughly 9 off the patella, which gave us a nice thickness of 14 mm.  We then used our sizer and guide to drill our 3 holes.  Trialed the patella.  The patient had excellent tracking and no further releases were needed.  With this completed, the trial components were removed.  We then sized the tibia to a 4 in appropriate external rotation.  This was pinned in position.  We reamed and broached for the universal tibial baseplate. With that completed, copious irrigation was performed.  We injected the posterior capsule with our TCO joint cocktail.  We did another copious irrigation.  We then flexed the knee  back up, dried and remove blood from the ends of the bones.  We then mixed our cement with one batch of antibiotics.  We then cemented the components without difficulty, waiting roughly 17 minutes for the cement to harden.  We did a 3-minute Betadine soak at the end.  Once that was completed, final trialing was performed.  We actually were happy with the 14 poly.  The 14 poly was placed and the knee gave us excellent essentially full range of motion and excellent balance at 0 and 30 degrees.  Copious irrigation was once again performed.  Final poly was impacted.  1 g powdered vancomycin was placed in the joint.  Standard joint closure.  Sterile dressings were applied.  The patient was brought to PACU in stable condition.  Needle and sponge counts correct.     PLAN:  The patient will be weightbearing as tolerated.  The patient will be admitted outpatient in a bed.  Ancef 2 g were given within an hour of the procedure, and this will be discontinued in 24 hours.  The patient was started on enteric-coated aspirin and pneumo boots for DVT prophylaxis.  If the patient passes PT and medical evaluation, the patient will be discharged in the morning with a followup in 2 weeks.        Rolando Pastor MD

## 2025-05-06 NOTE — PROGRESS NOTES
05/06/25 1635   Appointment Info   Signing Clinician's Name / Credentials (PT) SALONI Yi   Student Supervision Therapy services provided with the co-signing licensed therapist guiding and directing the services, and providing the skilled judgement and assessment throughout the session;Direct supervision provided   Rehab Comments (PT) LLE WBAT   Living Environment   People in Home alone   Current Living Arrangements independent living facility   Home Accessibility no concerns   Transportation Anticipated family or friend will provide   Living Environment Comments Pt reports there is an elevator. Walk in shower, grab bars and shower seat. Son able to assist at d/c.   Self-Care   Usual Activity Tolerance good   Current Activity Tolerance moderate   Regular Exercise No   Equipment Currently Used at Home cane, straight;dressing device;grab bar, toilet;grab bar, tub/shower;tub bench;walker, rolling   Fall history within last six months no   Activity/Exercise/Self-Care Comment No AD for short distances, otherwise uses 4WW. Also has cane and walking stick.   General Information   Onset of Illness/Injury or Date of Surgery 05/06/25   Referring Physician Rolando Pastor MD   Patient/Family Therapy Goals Statement (PT) return home   Pertinent History of Current Problem (include personal factors and/or comorbidities that impact the POC) Pt is 73 yo female who underwent L TKA on 5/6/2025.   Existing Precautions/Restrictions fall;weight bearing   Weight-Bearing Status - LLE weight-bearing as tolerated   Cognition   Affect/Mental Status (Cognition) WFL   Follows Commands (Cognition) WFL   Pain Assessment   Patient Currently in Pain Yes, see Vital Sign flowsheet   Integumentary/Edema   Integumentary/Edema Comments LLE ACE bandage intact   Posture    Posture Forward head position   Range of Motion (ROM)   Range of Motion ROM deficits secondary to surgical procedure   Strength (Manual Muscle Testing)   Strength (Manual  Muscle Testing) Deficits observed during functional mobility   Bed Mobility   Comment, (Bed Mobility) supine <> sit SBA   Transfers   Comment, (Transfers) sit <> stand FWW and mod A   Gait/Stairs (Locomotion)   Comment, (Gait/Stairs) amb w/ FWW and CGA   Balance   Balance Comments impaired: FWW and CGA required for amb and standing balance   Sensory Examination   Sensory Perception patient reports no sensory changes   Clinical Impression   Criteria for Skilled Therapeutic Intervention Yes, treatment indicated   PT Diagnosis (PT) impaired funcitonal mobility   Influenced by the following impairments weakness, impaired ROM, impaired balance, pain   Functional limitations due to impairments difficulty w/ bed mobility, transfers, and amb   Clinical Presentation (PT Evaluation Complexity) stable   Clinical Presentation Rationale clinical judgement   Clinical Decision Making (Complexity) low complexity   Planned Therapy Interventions (PT) balance training;bed mobility training;gait training;home exercise program;patient/family education;ROM (range of motion);strengthening;transfer training;progressive activity/exercise;risk factor education;home program guidelines   Risk & Benefits of therapy have been explained evaluation/treatment results reviewed;care plan/treatment goals reviewed;risks/benefits reviewed;current/potential barriers reviewed;participants voiced agreement with care plan;patient;participants included;son   PT Total Evaluation Time   PT Eval, Low Complexity Minutes ([id number]) 8   Physical Therapy Goals   PT Frequency Daily   PT Predicted Duration/Target Date for Goal Attainment 05/08/25   PT Goals Bed Mobility;Transfers;Gait   PT: Bed Mobility Independent;Supine to/from sit   PT: Transfers Modified independent;Sit to/from stand;Assistive device   PT: Gait Modified independent;Assistive device;150 feet   PT Discharge Planning   PT Plan progress amb distance, repeated STS, IND bed mobility   PT Discharge  Recommendation (DC Rec) other (see comments)  (defer to ortho)   PT Rationale for DC Rec Pt is currently below baseline. At baseline pt is IND w/ all ADLs and amb IND and w/ 4WW for longer distances. Currently requires FWW and CGA for amb, plus mod A for transfers, limited by weakness, impaired ROM, impaired balance, and pain s/p L TKA. Anticipate pt will be able to meet IP PT goals and be able to d/c home with assist of son with continued skilled IP PT. Pt has 4WW, SPC and walking stick.   PT Brief overview of current status FWW Ax1   PT Total Distance Amb During Session (feet) 90   Physical Therapy Time and Intention   Total Session Time (sum of timed and untimed services) 8

## 2025-05-06 NOTE — PROGRESS NOTES
"Routine consult received for \"Hospitalist Consult\"    This is a 75 yo F outpatient s/p L TKA.  Med rec reviewed.  Resumed PTA medications, hold parameters added to blood pressure medication.  Hospitalist PA will review chart in the morning if no concerns likely will hold off on charge for formal consult as patient is likely discharging.    If any acute medical concerns overnight contact \"[facility]\" on Vocera.   "

## 2025-05-06 NOTE — ANESTHESIA CARE TRANSFER NOTE
Patient: Dafne Choi    Procedure: Procedure(s):  Left total knee arthroplasty       Diagnosis: Osteoarthritis of left knee [M17.12]  Diagnosis Additional Information: No value filed.    Anesthesia Type:   General     Note:    Oropharynx: oropharynx clear of all foreign objects and spontaneously breathing  Level of Consciousness: drowsy  Oxygen Supplementation: face mask  Level of Supplemental Oxygen (L/min / FiO2): 6  Independent Airway: airway patency satisfactory and stable  Dentition: dentition unchanged  Vital Signs Stable: post-procedure vital signs reviewed and stable  Report to RN Given: handoff report given  Patient transferred to: PACU    Handoff Report: Identifed the Patient, Identified the Reponsible Provider, Reviewed the pertinent medical history, Discussed the surgical course, Reviewed Intra-OP anesthesia mangement and issues during anesthesia, Set expectations for post-procedure period and Allowed opportunity for questions and acknowledgement of understanding      Vitals:  Vitals Value Taken Time   BP     Temp 80.06  F (26.7  C) 05/06/25 1010   Pulse 94 05/06/25 1010   Resp 11 05/06/25 1010   SpO2 96 % 05/06/25 1010   Vitals shown include unfiled device data.    Electronically Signed By: ABIGAIL Askew CRNA  May 6, 2025  10:12 AM

## 2025-05-06 NOTE — BRIEF OP NOTE
[facility]    Brief Operative Note    Pre-operative diagnosis: Osteoarthritis of left knee [M17.12]  Post-operative diagnosis Same as pre-operative diagnosis    Procedure: Left total knee arthroplasty, Left - Knee    Surgeon: Surgeons and Role:     * Rolando Pastor MD - Primary     * Jacky Arriola PA-C - Assisting  Anesthesia: General with Block   Estimated Blood Loss: Less than 100 ml    Drains: None  Specimens: * No specimens in log *  Findings:   None.  Complications: None.  Implants:   Implant Name Type Inv. Item Serial No.  Lot No. LRB No. Used Action   BONE CEMENT SIMPLEX FULL DOSE 6191-1-001 - [id number] Cement, Bone BONE CEMENT SIMPLEX FULL DOSE 6191-1-001  [employer] [id number] Left 2 Implanted   IMP COMP FEM STRK TRIATHLN PS LT 3 5515-F-301 - [id number] Total Joint Component/Insert IMP COMP FEM STRK TRIATHLN PS LT 3 5515-F-301  [employer] [id number] Left 1 Implanted   IMP COMP PATELLA HOWM ASYM TRI [id number] 5551-L-320 - [id number] Total Joint Component/Insert IMP COMP PATELLA HOWM ASYM TRI [id number] 5551-L-320  [employer] [id number] Left 1 Implanted   IMP INSERT BASEPLATE TIBIAL HOWM TRI 4 5521-B-400 - [id number] Total Joint Component/Insert IMP INSERT BASEPLATE TIBIAL HOWM TRI 4 5521-B-400  [employer] [id number] Left 1 Implanted   INSERT TIB 4 14MM KN X3 POST STAB BRNG TRTHLN STRL LF - [id number] Total Joint Component/Insert INSERT TIB 4 14MM KN X3 POST STAB BRNG TRTHLN STRL LF  [employer] [id number] Left 1 Implanted

## 2025-05-07 ENCOUNTER — APPOINTMENT (OUTPATIENT)
Dept: OCCUPATIONAL THERAPY | Facility: CLINIC | Age: 75
End: 2025-05-07
Attending: ORTHOPAEDIC SURGERY
Payer: MEDICARE

## 2025-05-07 ENCOUNTER — APPOINTMENT (OUTPATIENT)
Dept: PHYSICAL THERAPY | Facility: CLINIC | Age: 75
End: 2025-05-07
Attending: ORTHOPAEDIC SURGERY
Payer: MEDICARE

## 2025-05-07 VITALS
SYSTOLIC BLOOD PRESSURE: 168 MMHG | DIASTOLIC BLOOD PRESSURE: 54 MMHG | WEIGHT: 245.2 LBS | OXYGEN SATURATION: 99 % | HEART RATE: 84 BPM | RESPIRATION RATE: 18 BRPM | BODY MASS INDEX: 46.29 KG/M2 | TEMPERATURE: 97.8 F | HEIGHT: 61 IN

## 2025-05-07 LAB
CREAT SERPL-MCNC: 1.26 MG/DL (ref 0.51–0.95)
EGFRCR SERPLBLD CKD-EPI 2021: 45 ML/MIN/1.73M2
FASTING STATUS PATIENT QL REPORTED: NO
GLUCOSE SERPL-MCNC: 124 MG/DL (ref 70–99)
HGB BLD-MCNC: 10.4 G/DL (ref 11.7–15.7)

## 2025-05-07 PROCEDURE — 85018 HEMOGLOBIN: CPT | Performed by: ORTHOPAEDIC SURGERY

## 2025-05-07 PROCEDURE — 97165 OT EVAL LOW COMPLEX 30 MIN: CPT | Mod: GO | Performed by: OCCUPATIONAL THERAPIST

## 2025-05-07 PROCEDURE — 97535 SELF CARE MNGMENT TRAINING: CPT | Mod: GO | Performed by: OCCUPATIONAL THERAPIST

## 2025-05-07 PROCEDURE — 250N000013 HC RX MED GY IP 250 OP 250 PS 637: Performed by: ORTHOPAEDIC SURGERY

## 2025-05-07 PROCEDURE — 82947 ASSAY GLUCOSE BLOOD QUANT: CPT | Performed by: ORTHOPAEDIC SURGERY

## 2025-05-07 PROCEDURE — 97110 THERAPEUTIC EXERCISES: CPT | Mod: GP

## 2025-05-07 PROCEDURE — 250N000011 HC RX IP 250 OP 636: Performed by: ORTHOPAEDIC SURGERY

## 2025-05-07 PROCEDURE — 250N000013 HC RX MED GY IP 250 OP 250 PS 637: Performed by: PHYSICIAN ASSISTANT

## 2025-05-07 PROCEDURE — 82565 ASSAY OF CREATININE: CPT | Performed by: ORTHOPAEDIC SURGERY

## 2025-05-07 PROCEDURE — 97116 GAIT TRAINING THERAPY: CPT | Mod: GP

## 2025-05-07 PROCEDURE — 36415 COLL VENOUS BLD VENIPUNCTURE: CPT | Performed by: ORTHOPAEDIC SURGERY

## 2025-05-07 RX ORDER — FAMOTIDINE 20 MG/1
20 TABLET, FILM COATED ORAL DAILY
Status: DISCONTINUED | OUTPATIENT
Start: 2025-05-08 | End: 2025-05-07 | Stop reason: HOSPADM

## 2025-05-07 RX ORDER — ASPIRIN 81 MG/1
81 TABLET ORAL 2 TIMES DAILY
Qty: 60 TABLET | Refills: 0 | Status: SHIPPED | OUTPATIENT
Start: 2025-05-07

## 2025-05-07 RX ADMIN — HYDROXYZINE HYDROCHLORIDE 10 MG: 10 TABLET ORAL at 06:04

## 2025-05-07 RX ADMIN — POLYETHYLENE GLYCOL 3350 17 G: 17 POWDER, FOR SOLUTION ORAL at 07:38

## 2025-05-07 RX ADMIN — ACETAMINOPHEN 975 MG: 325 TABLET, FILM COATED ORAL at 06:04

## 2025-05-07 RX ADMIN — FAMOTIDINE 20 MG: 20 TABLET, FILM COATED ORAL at 07:36

## 2025-05-07 RX ADMIN — ASPIRIN 325 MG: 325 TABLET, COATED ORAL at 07:36

## 2025-05-07 RX ADMIN — CEFAZOLIN SODIUM 2 G: 2 SOLUTION INTRAVENOUS at 00:07

## 2025-05-07 RX ADMIN — OXYCODONE HYDROCHLORIDE 5 MG: 5 TABLET ORAL at 02:22

## 2025-05-07 RX ADMIN — LISINOPRIL 2.5 MG: 2.5 TABLET ORAL at 07:36

## 2025-05-07 RX ADMIN — OXYCODONE HYDROCHLORIDE 5 MG: 5 TABLET ORAL at 07:36

## 2025-05-07 RX ADMIN — GABAPENTIN 600 MG: 600 TABLET, FILM COATED ORAL at 07:36

## 2025-05-07 RX ADMIN — TRIAMTERENE AND HYDROCHLOROTHIAZIDE 1 TABLET: 37.5; 25 TABLET ORAL at 07:36

## 2025-05-07 ASSESSMENT — ACTIVITIES OF DAILY LIVING (ADL)
ADLS_ACUITY_SCORE: 36
ADLS_ACUITY_SCORE: 32
ADLS_ACUITY_SCORE: 31
ADLS_ACUITY_SCORE: 32
ADLS_ACUITY_SCORE: 36
PREVIOUS_RESPONSIBILITIES: MEAL PREP;HOUSEKEEPING;LAUNDRY;SHOPPING;YARDWORK;MEDICATION MANAGEMENT;FINANCES;DRIVING
ADLS_ACUITY_SCORE: 31
ADLS_ACUITY_SCORE: 31
ADLS_ACUITY_SCORE: 36

## 2025-05-07 NOTE — DISCHARGE SUMMARY
[facility] Discharge Summary    Dafne Choi MRN# [id number]   Age: 74 year old YOB: 1950     Date of Admission:  5/6/2025  Date of Discharge::  5/7/2025  Admitting Physician:  Rolando Pastor MD  Discharge Physician:  Jacky Arriola PA-C          Admission Diagnoses:   Osteoarthritis of left knee [M17.12]  Arthoplasty of the knee          Discharge Diagnosis:     Arthoplasty of the knee          Procedures:     Procedure(s): Total knee arthoplasty (Left)                  Medications Prior to Admission:     Medications Prior to Admission   Medication Sig Dispense Refill Last Dose/Taking    Acetaminophen (TYLENOL PO) Take 500 mg by mouth 2 times daily Give with each scheduled oxycodone dose   5/5/2025 Bedtime    calcium-vitamin D (CALTRATE) 600-400 MG-UNIT per tablet Take 2 tablets by mouth 2 times daily   4/29/2025    gabapentin (NEURONTIN) 600 MG tablet Take 600 mg by mouth 2 times daily.   5/5/2025 Bedtime    letrozole (FEMARA) 2.5 MG tablet Take 2.5 mg by mouth daily.   5/5/2025 Bedtime    lisinopril (ZESTRIL) 2.5 MG tablet Take 2.5 mg by mouth daily.   5/5/2025 Morning    loratadine (CLARITIN) 10 MG tablet Take 10 mg by mouth daily   4/29/2025    melatonin 1 MG TABS Take 1 tablet (1 mg) by mouth nightly as needed for sleep   4/29/2025    multivitamin, therapeutic with minerals (MULTI-VITAMIN) TABS Take 1 tablet by mouth daily   4/29/2025    rOPINIRole (REQUIP) 0.25 MG tablet Take 0.25 mg by mouth at bedtime.   5/5/2025 Bedtime    simvastatin (ZOCOR) 20 MG tablet Take 20 mg by mouth at bedtime.   5/5/2025 Bedtime    triamterene-hydrochlorothiazide (MAXZIDE-25) 37.5-25 MG per tablet Take 1 tablet by mouth daily   5/6/2025 at  5:00 AM             Discharge Medications:     Current Discharge Medication List        START taking these medications    Details   aspirin 81 MG EC tablet Take 1 tablet (81 mg) by mouth 2 times daily.  Qty: 60 tablet, Refills: 0    Associated Diagnoses:  S/P TKR (total knee replacement), left      hydrOXYzine HCl (ATARAX) 10 MG tablet Take 1 tablet (10 mg) by mouth every 6 hours as needed for itching or anxiety (with pain, moderate pain).  Qty: 30 tablet, Refills: 0    Associated Diagnoses: Primary osteoarthritis of left knee      ondansetron (ZOFRAN ODT) 4 MG ODT tab Take 1 tablet (4 mg) by mouth every 8 hours as needed for nausea. Dissolve ON the tongue.  Qty: 10 tablet, Refills: 0    Associated Diagnoses: Primary osteoarthritis of left knee      oxyCODONE (ROXICODONE) 5 MG tablet Take 1-2 tablets (5-10 mg) by mouth every 4 hours as needed for moderate to severe pain.  Qty: 33 tablet, Refills: 0    Associated Diagnoses: Primary osteoarthritis of left knee      senna-docusate (SENOKOT-S/PERICOLACE) 8.6-50 MG tablet Take 1-2 tablets by mouth 2 times daily. Take while on oral narcotics to prevent or treat constipation.  Qty: 30 tablet, Refills: 0    Comments: While taking narcotics  Associated Diagnoses: Primary osteoarthritis of left knee           CONTINUE these medications which have NOT CHANGED    Details   Acetaminophen (TYLENOL PO) Take 500 mg by mouth 2 times daily Give with each scheduled oxycodone dose      calcium-vitamin D (CALTRATE) 600-400 MG-UNIT per tablet Take 2 tablets by mouth 2 times daily      gabapentin (NEURONTIN) 600 MG tablet Take 600 mg by mouth 2 times daily.      letrozole (FEMARA) 2.5 MG tablet Take 2.5 mg by mouth daily.      lisinopril (ZESTRIL) 2.5 MG tablet Take 2.5 mg by mouth daily.      loratadine (CLARITIN) 10 MG tablet Take 10 mg by mouth daily      melatonin 1 MG TABS Take 1 tablet (1 mg) by mouth nightly as needed for sleep    Associated Diagnoses: Status post total right knee replacement      multivitamin, therapeutic with minerals (MULTI-VITAMIN) TABS Take 1 tablet by mouth daily      rOPINIRole (REQUIP) 0.25 MG tablet Take 0.25 mg by mouth at bedtime.      simvastatin (ZOCOR) 20 MG tablet Take 20 mg by mouth at bedtime.       triamterene-hydrochlorothiazide (MAXZIDE-25) 37.5-25 MG per tablet Take 1 tablet by mouth daily                   Consultations:   Consultation during this admission received from internal medicine          Brief History of Illness:   Reason for admission requiring a surgical or invasive procedure:   Patient Active Problem List    Diagnosis    Primary osteoarthritis of left knee    Status post total right knee replacement on 11/7/16 by Rolando Pastor MD    Aftercare following right knee joint replacement surgery    Anemia due to blood loss, acute    Physical deconditioning    Essential hypertension, benign    Constipation, unspecified constipation type      The patient underwent the following procedure(s):   Total knee arthoplasty (Left)   There were no immediate complications during this procedure.    Please refer to the full operative summary for details.             Hospital Course:   The patient's hospital course was unremarkable.  She recovered as anticipated and experienced no post-operative complications.          Discharge Instructions and Follow-Up:     Discharge diet: Advance to a regular diet as tolerated   Discharge activity: Activity as tolerated   Discharge follow-up: Follow up with Orthopedic Team in 2 weeks   Wound care: Keep wound clean and dry  ACE replaced. Glue looks dry and intact.               Discharge Disposition:     Discharged to home    ASA 81 bid for DVT   Attestation:  I have reviewed today's vital signs, notes, medications, labs and imaging.  Amount of time performed on this discharge summary: 15 minutes.    Jacky Arriola PA-C

## 2025-05-07 NOTE — PLAN OF CARE
Goal Outcome Evaluation:      Plan of Care Reviewed With: patient    Overall Patient Progress: improvingOverall Patient Progress: improving    Outcome Evaluation: No acute events overnight        Pt is alert, forgetful. Tolerating regular diet. No nausea or vomiting. VSS on RA. Ambulating with 1A, GB, FWW. Pain managed with Tylenol, Oxy, Atarax. Voiding adequately. Working with PT. Possible discharge home today.      Problem: Delirium  Goal: Optimal Coping  5/7/2025 0316 by Diane Jones RN  Outcome: Progressing  5/6/2025 2320 by Diane Jones RN  Outcome: Progressing  Goal: Improved Behavioral Control  5/7/2025 0316 by Diane Jones RN  Outcome: Progressing  5/6/2025 2320 by Diane Jones RN  Outcome: Progressing  Intervention: Minimize Safety Risk  Recent Flowsheet Documentation  Taken 5/6/2025 2100 by Diane Jones RN  Enhanced Safety Measures:   assistive devices when indicated   pain management  Goal: Improved Attention and Thought Clarity  5/7/2025 0316 by Diane Jones RN  Outcome: Progressing  5/6/2025 2320 by Diane Jones RN  Outcome: Progressing  Intervention: Maximize Cognitive Function  Recent Flowsheet Documentation  Taken 5/6/2025 2100 by Diane Jones RN  Reorientation Measures: reorientation provided  Goal: Improved Sleep  5/7/2025 0316 by Diane Jones RN  Outcome: Progressing  5/6/2025 2320 by Diane Jones RN  Outcome: Progressing     Problem: Adult Inpatient Plan of Care  Goal: Plan of Care Review  Description: The Plan of Care Review/Shift note should be completed every shift.  The Outcome Evaluation is a brief statement about your assessment that the patient is improving, declining, or no change.  This information will be displayed automatically on your shiftnote.  5/7/2025 0316 by Dinae Jones RN  Outcome: Progressing  Flowsheets (Taken 5/7/2025 0316)  Outcome Evaluation:  "No acute events overnight  Plan of Care Reviewed With: patient  Overall Patient Progress: improving  5/6/2025 2320 by Diane Jones RN  Outcome: Progressing  Flowsheets (Taken 5/6/2025 2320)  Plan of Care Reviewed With: patient  Overall Patient Progress: improving  Goal: Patient-Specific Goal (Individualized)  Description: You can add care plan individualizations to a care plan. Examples of Individualization might be:  \"Parent requests to be called daily at 9am for status\", \"I have a hard time hearing out of my right ear\", or \"Do not touch me to wake me up as it startlesme\".  5/7/2025 0316 by Diane Jones RN  Outcome: Progressing  5/6/2025 2320 by Diane Jones RN  Outcome: Progressing  Goal: Absence of Hospital-Acquired Illness or Injury  5/7/2025 0316 by Diane Jones RN  Outcome: Progressing  5/6/2025 2320 by Diane Jones RN  Outcome: Progressing  Intervention: Identify and Manage Fall Risk  Recent Flowsheet Documentation  Taken 5/6/2025 2100 by Diane Jones RN  Safety Promotion/Fall Prevention:   activity supervised   assistive device/personal items within reach   nonskid shoes/slippers when out of bed   clutter free environment maintained   safety round/check completed  Intervention: Prevent Infection  Recent Flowsheet Documentation  Taken 5/6/2025 2100 by Diane Jones RN  Infection Prevention: rest/sleep promoted  Goal: Optimal Comfort and Wellbeing  5/7/2025 0316 by Diane Jones RN  Outcome: Progressing  5/6/2025 2320 by Diane Jones RN  Outcome: Progressing  Goal: Readiness for Transition of Care  5/7/2025 0316 by Diane Jones RN  Outcome: Progressing  5/6/2025 2320 by Diane Jones RN  Outcome: Progressing     Problem: Knee Arthroplasty  Goal: Optimal Coping  5/7/2025 0316 by Diane Jones RN  Outcome: Progressing  5/6/2025 2320 by Diane Jones RN  Outcome: " Progressing  Goal: Absence of Bleeding  5/7/2025 0316 by Diane Jones RN  Outcome: Progressing  5/6/2025 2320 by Diane Jones RN  Outcome: Progressing  Goal: Effective Bowel Elimination  5/7/2025 0316 by Diane Jones RN  Outcome: Progressing  5/6/2025 2320 by Diane Jones RN  Outcome: Progressing  Goal: Fluid and Electrolyte Balance  5/7/2025 0316 by Diane Jones RN  Outcome: Progressing  5/6/2025 2320 by Diane Jones RN  Outcome: Progressing  Goal: Optimal Functional Ability  5/7/2025 0316 by Diane Jones RN  Outcome: Progressing  5/6/2025 2320 by Diane Jones RN  Outcome: Progressing  Goal: Absence of Infection Signs and Symptoms  5/7/2025 0316 by Diane Jones RN  Outcome: Progressing  5/6/2025 2320 by Diane Jones RN  Outcome: Progressing  Goal: Intact Neurovascular Status  5/7/2025 0316 by Diane Jones RN  Outcome: Progressing  5/6/2025 2320 by Diane Jones RN  Outcome: Progressing  Goal: Anesthesia/Sedation Recovery  5/7/2025 0316 by Diane Jones RN  Outcome: Progressing  5/6/2025 2320 by Diane Jones RN  Outcome: Progressing  Intervention: Optimize Anesthesia Recovery  Recent Flowsheet Documentation  Taken 5/6/2025 2100 by Diane Jones RN  Safety Promotion/Fall Prevention:   activity supervised   assistive device/personal items within reach   nonskid shoes/slippers when out of bed   clutter free environment maintained   safety round/check completed  Reorientation Measures: reorientation provided  Goal: Optimal Pain Control and Function  5/7/2025 0316 by Diane Jones RN  Outcome: Progressing  5/6/2025 2320 by Diane Jones RN  Outcome: Progressing  Goal: Nausea and Vomiting Relief  5/7/2025 0316 by Diane Jones RN  Outcome: Progressing  5/6/2025 2320 by Diane Jones, RN  Outcome:  Progressing  Goal: Effective Urinary Elimination  5/7/2025 0316 by Diane Jones RN  Outcome: Progressing  5/6/2025 2320 by Diane Jones RN  Outcome: Progressing  Goal: Effective Oxygenation and Ventilation  5/7/2025 0316 by Diane Jones RN  Outcome: Progressing  5/6/2025 2320 by Diane Jones RN  Outcome: Progressing

## 2025-05-07 NOTE — PLAN OF CARE
Occupational Therapy Discharge Summary    Reason for therapy discharge:    All goals and outcomes met, no further needs identified.    Progress towards therapy goal(s). See goals on Care Plan in Epic electronic health record for goal details.  Goals met    Therapy recommendation(s):    Defer to ortho MD for further therapy recommendations. See OT note for details on performance.

## 2025-05-07 NOTE — PROGRESS NOTES
"[facility] Orthopedic Post-Op Note         Assessment and Plan:    Assessment:   Post-operative day #1  Total knee arthoplasty (Left)     Doing well.  No immediate surgical complications identified.       Plan:   Orders Placed This Encounter   Procedures    XR Knee Port Left 1/2 Views    Hemoglobin    Hemoglobin    Creatinine    Glucose    EKG Cardiac - HIM Scan    Catheter Site Care    Elevate Head of bed    Vital signs    Peripheral Neurovascular Assessment checks    Discontinue IV and Saline Lock    Intake and output    Assess heels for pressure points    Orthopedic  Stop Light Tool  - review, explain, document    Capnography Monitoring - ADULT    Capnography - TEMPORARY REMOVAL    Capnography - DISCONTINUATION    Pulse oximetry nursing    Peripheral IV catheter    Catheter Site Care    Place Kidd catheter for urinary retention    Notify Provider    Notify Provider    Turn cough deep breathe    Incentive spirometry nursing    Glucose monitor nursing POCT    Notify Provider to consider patient status change    Notify Provider - HGB less than 8.0    Notify Provider    Cold Therapy to surgical site    Assess Dressing    Apply pneumatic compression device (PCD)    Patient care order    NO Indwelling urinary catheter (Kidd) PRESENT or NEEDED    Voiding Protocol (Trial of Void)    No Surgical Dressing Change    NO Elastic Compression Device (ACE wrap or Tubigrip)    Activity Ambulate with assist    Activity Up with assist    Activity Up in chair    Activity Other (specify in comments)    ROM to surgical knee    Position Knee    Elevate operative extremity    NO CPM    Weight bearing as tolerated    NO Lower Extremity Brace / Immobilizer / Orthopedic Specialty Device    Discharge goals - Orthopedic    Review discharge instructions    Review and explain the Orthopedic \"Stoplight Tool\" document    Review discharge medication instructions    Teach brace/immobilizer instructions    Discharge patient " with     Catheter care instructions    Kidd catheter supplies    Urology care follow-up instructions    Reason for your hospital stay    When to call - Contact Surgeon Team    When to call - Reach out to Urgent Care    When to call - Reasons to Call 911    Discharge Instruction - Breathing exercises    Symptoms - Fever Management    Symptoms - Constipation management    Symptoms - Reduced Urine Output    Activity - Exercises to prevent blood clots    Comfort and Pain Management - Pain after Surgery    Comfort and Pain Management - Swelling after Surgery    Comfort and Pain Management - Cold therapy    Medication Instructions - Acetaminophen (TYLENOL) Instructions    Medication Instructions - NSAID Instructions    Follow Up Care    Medication instructions -  Anticoagulation - aspirin    Comfort and Pain Management - LOWER Extremity Elevation    Opioid Instructions (Greater than or equal to 65 years)    Medication Instructions - Opioids - Tapering Instructions    Medication Instructions - Muscle relaxant Medication Instructions    Activity - Total Knee Arthroplasty    Return to Driving    Dressing / Wound Care - Wound    Dressing / Wound Care - NO Tub Bathing    NO Precautions    Weight bearing as tolerated    Dressing Wound Care - Shower with wound/dressing NOT covered    I have reviewed and agree with all the recommendations and orders detailed in this document.    Full Code    Hospitalist IP Consult: Requesting provider? Attending physician; Hospitalist Consult; Consultant may enter orders: Yes    Care Management / Social Work IP Consult    Physical Therapy Adult IP Consult    Occupational Therapy Adult IP Consult    Nursing to Consult for [facility] Care IP    Oxygen: Nasal cannula, Oxygen mask    Outpatient in a Bed    Discharge patient    Crutches DME    Cane DME    Walker DME     Ambulate  Advance activity as tolerated  Pain control measures  Advance diet as tolerated           Interval History:     Doing  well.  Continues to improve.  Pain is well-controlled.  No fevers.  Alert, but somewhat forgetful which is her baseline.             Significant Problems:   (Refer to attending physician notes regarding additional medical problems and issues)  Patient Active Problem List   Diagnosis    Status post total right knee replacement on 11/7/16 by Rolando Pastor MD    Aftercare following right knee joint replacement surgery    Anemia due to blood loss, acute    Physical deconditioning    Essential hypertension, benign    Constipation, unspecified constipation type    Primary osteoarthritis of left knee             Review of Systems:   The patient denies any chest pain, shortness of breath, excessive pain, fever, chills, purulent drainage from the wound, nausea or vomiting.          Medications:   All medications related to the patient's surgery have been reviewed  Current Facility-Administered Medications   Medication Dose Route Frequency Provider Last Rate Last Admin    acetaminophen (TYLENOL) tablet 975 mg  975 mg Oral Q8H Rolando Pastor MD   975 mg at 05/07/25 0604    aspirin (ASA) EC tablet 325 mg  325 mg Oral Daily Rolando Pastor MD   325 mg at 05/07/25 0736    benzocaine-menthol (CHLORASEPTIC) 6-10 MG lozenge 1 lozenge  1 lozenge Buccal Q1H PRN Rolando Pastor MD        [START ON 5/9/2025] bisacodyl (DULCOLAX) suppository 10 mg  10 mg Rectal Daily PRN Rolando Pastor MD        [Held by provider] celecoxib (celeBREX) capsule 100 mg  100 mg Oral BID Rolando Pastor MD        famotidine (PEPCID) tablet 20 mg  20 mg Oral BID Rolando Pastor MD   20 mg at 05/07/25 0736    Or    famotidine (PEPCID) injection 20 mg  20 mg Intravenous BID Rolando Pastor MD        gabapentin (NEURONTIN) tablet 600 mg  600 mg Oral BID Yas Toro PA-C   600 mg at 05/07/25 0736    HYDROmorphone (DILAUDID) injection 0.1 mg  0.1 mg Intravenous Q4H PRN Rolando Pastor MD        Or    HYDROmorphone (DILAUDID) injection 0.2 mg   0.2 mg Intravenous Q4H PRN Rolando Pastor MD        hydrOXYzine HCl (ATARAX) tablet 10 mg  10 mg Oral Q6H PRN Rolando Pastor MD   10 mg at 05/07/25 0604    lactated ringers infusion   Intravenous Continuous Rolando Pastor MD   Stopped at 05/07/25 0558    letrozole (FEMARA) tablet 2.5 mg  2.5 mg Oral At Bedtime Yas Toro PA-C   2.5 mg at 05/06/25 2105    lidocaine (LMX4) cream   Topical Q1H PRN Rolando Pastor MD        lidocaine 1 % 0.1-1 mL  0.1-1 mL Other Q1H PRN Rolando Pastor MD        lisinopril (ZESTRIL) tablet 2.5 mg  2.5 mg Oral Daily Yas Toro PA-C   2.5 mg at 05/07/25 0736    [START ON 5/8/2025] magnesium hydroxide (MILK OF MAGNESIA) suspension 30 mL  30 mL Oral Daily PRN Rolando Pastor MD        naloxone (NARCAN) injection 0.2 mg  0.2 mg Intravenous Q2 Min PRN Rolando Pastor MD        Or    naloxone (NARCAN) injection 0.4 mg  0.4 mg Intravenous Q2 Min PRN Rolando Pastor MD        Or    naloxone (NARCAN) injection 0.2 mg  0.2 mg Intramuscular Q2 Min PRN Rolando Pastor MD        Or    naloxone (NARCAN) injection 0.4 mg  0.4 mg Intramuscular Q2 Min PRN Rolando Pastor MD        ondansetron (ZOFRAN ODT) ODT tab 4 mg  4 mg Oral Q6H PRN Rolando Pastor MD        Or    ondansetron (ZOFRAN) injection 4 mg  4 mg Intravenous Q6H PRN Rolando Pastor MD        oxyCODONE IR (ROXICODONE) half-tab 2.5 mg  2.5 mg Oral Q4H PRN Rolando Pastor MD        Or    oxyCODONE (ROXICODONE) tablet 5 mg  5 mg Oral Q4H PRN Rolando Pastor MD   5 mg at 05/07/25 0736    polyethylene glycol (MIRALAX) Packet 17 g  17 g Oral Daily Rolando Pastor MD   17 g at 05/07/25 0738    prochlorperazine (COMPAZINE) injection 5 mg  5 mg Intravenous Q6H PRN Rolando Pastor MD        Or    prochlorperazine (COMPAZINE) tablet 5 mg  5 mg Oral Q6H PRN Rolando Pastor MD        rOPINIRole (REQUIP) tablet 0.25 mg  0.25 mg Oral At Bedtime Yas Toro PA-C   0.25 mg at 05/06/25 7415    senna-docusate  (SENOKOT-S/PERICOLACE) 8.6-50 MG per tablet 1 tablet  1 tablet Oral BID Rolando Pastor MD   1 tablet at 05/06/25 2106    simvastatin (ZOCOR) tablet 20 mg  20 mg Oral At Bedtime Yas Toro PA-C   20 mg at 05/06/25 2104    sodium chloride (PF) 0.9% PF flush 3 mL  3 mL Intracatheter Q8H JUANI Rolando Pastor MD   3 mL at 05/07/25 0605    sodium chloride (PF) 0.9% PF flush 3 mL  3 mL Intracatheter q1 min prn Rolando Pastor MD        triamterene-HCTZ (MAXZIDE-25) 37.5-25 MG per tablet 1 tablet  1 tablet Oral Daily Yas Toro PA-C   1 tablet at 05/07/25 0736             Physical Exam:   All vitals stable  Patient Vitals for the past 12 hrs:   BP Temp Temp src Pulse Resp SpO2   05/07/25 0730 (!) 168/54 97.8  F (36.6  C) Temporal 84 18 99 %   05/06/25 2313 (!) 157/50 98.1  F (36.7  C) Temporal 87 16 99 %   05/06/25 2111 -- -- -- 89 17 97 %     Wound clean and dry with minimal or no drainage.  Surrounding skin with minimal erythema.  Dressing dry and intact.           Data:     Hemoglobin   Date Value Ref Range Status   05/07/2025 10.4 (L) 11.7 - 15.7 g/dL Final   11/14/2016 11.9 11.7 - 15.7 g/dL Final   11/09/2016 11.6 (L) 11.7 - 15.7 g/dL Final   11/08/2016 12.2 11.7 - 15.7 g/dL Final   10/09/2016 14.1 11.7 - 15.7 g/dL Final     All imaging studies related to this surgery reviewed  Hardware is intact and in good approximation     Jacky Arriola PA-C  0830  5/7/25  [phone]

## 2025-05-07 NOTE — CONSULTS
Care Management Discharge Note    Discharge Date: 05/07/2025     Discharge Disposition: Home    Discharge Services: None    Discharge DME: None    Discharge Transportation: family or friend will provide    Private pay costs discussed: Not applicable    Does the patient's insurance plan have a 3 day qualifying hospital stay waiver?  No    PAS Confirmation Code: N/A    Patient/family educated on Medicare website which has current facility and service quality ratings: no    Education Provided on the Discharge Plan:      Persons Notified of Discharge Plans: Patient, son    Patient/Family in Agreement with the Plan: yes    Handoff Referral Completed: No, handoff not indicated or clinically appropriate    Additional Information:  CM/SW was consulted for discharge planning. Pt will discharge home with son today. SW confirmed that her son will be providing transportation. No further care management intervention anticipated at this time.  Please re-consult if further needs arise.  Care management signing off.      JAIRO Pacheco  Inpatient Care Coordination   [facility]  [phone]

## 2025-05-07 NOTE — PLAN OF CARE
Goal Outcome Evaluation:      Plan of Care Reviewed With: patient    Overall Patient Progress: improvingOverall Patient Progress: improving    Outcome Evaluation: Patient will discharge home with son

## 2025-05-07 NOTE — PLAN OF CARE
Physical Therapy Discharge Summary    Reason for therapy discharge:    All goals and outcomes met, no further needs identified.    Progress towards therapy goal(s). See goals on Care Plan in [facility] electronic health record for goal details.  Goals met    Therapy recommendation(s):    Defer to ortho

## 2025-05-07 NOTE — PROGRESS NOTES
[facility]                                                                                   OUTPATIENT OCCUPATIONAL THERAPY    PLAN OF TREATMENT FOR OUTPATIENT REHABILITATION   Patient's Last Name, First Name, Dafne Mary YOB: 1950   Provider's Name   [facility]   Medical Record No.  [id number]     Onset Date: 05/06/25 Start of Care Date: (P) 05/07/25     Medical Diagnosis:  (P) LTKA               OT Diagnosis:  (P) Decline in ADL independence and safety Certification Dates:  From: (P) 05/07/25  To: (P) 05/07/25     See note for plan of treatment, functional goals, and certification details.    I CERTIFY THE NEED FOR THESE SERVICES FURNISHED UNDER        THIS PLAN OF TREATMENT AND WHILE UNDER MY CARE (Physician co-signature of this document indicates review and certification of the therapy plan).                       05/07/25 1000   Appointment Info   Signing Clinician's Name / Credentials (OT) Joyce Giron OTRL   Quick Adds   Quick Adds Certification   Living Environment   People in Home alone   Current Living Arrangements independent living facility   Home Accessibility no concerns   Living Environment Comments Pt lives in an apartment with elevator, walk in shower with grab bar and shower chair, raised toielt seat with rails   Self-Care   Usual Activity Tolerance good   Current Activity Tolerance moderate   Regular Exercise No   Equipment Currently Used at Home cane, straight;dressing device;grab bar, toilet;grab bar, tub/shower;tub bench;walker, rolling   Fall history within last six months no   Activity/Exercise/Self-Care Comment No AD for short distances, otherwise uses 4WW. Also has cane and walking stick. Reports independence with ADLs and IADLs at baseline   Instrumental Activities of Daily Living (IADL)   Previous Responsibilities meal prep;housekeeping;laundry;shopping;yardwork;medication  management;finances;driving   IADL Comments Pt son and family able to assist as needed at discharge   General Information   Onset of Illness/Injury or Date of Surgery 05/06/25   Referring Physician Rolando Pastor MD   Patient/Family Therapy Goal Statement (OT) Pt would like to return home this date   Additional Occupational Profile Info/Pertinent History of Current Problem Osteoarthritis of left knee (M17.12)  Arthoplasty of the knee   Existing Precautions/Restrictions fall;weight bearing   Left Lower Extremity (Weight-bearing Status) weight-bearing as tolerated (WBAT)   Cognitive Status Examination   Orientation Status orientation to person, place and time   Affect/Mental Status (Cognitive) WFL   Follows Commands WFL   Cognitive Status Comments Pt appears at her cognitive baseline, good recall of education   Visual Perception   Visual Impairment/Limitations corrective lenses full-time   Sensory   Sensory Comments Pt reports intact   Pain Assessment   Patient Currently in Pain Yes, see Vital Sign flowsheet   Posture   Posture forward head position;protracted shoulders   Range of Motion Comprehensive   Comment, General Range of Motion LLE limited due to pain   Strength Comprehensive (MMT)   Comment, General Manual Muscle Testing (MMT) Assessment LLE limited due to pain   Bed Mobility   Comment (Bed Mobility) SBA   Transfers   Transfer Comments CGA   Balance   Balance Comments Intact with gait aid   Activities of Daily Living   BADL Assessment/Intervention bathing;lower body dressing;toileting   Bathing Assessment/Intervention   Comment, (Bathing) Min assist   Lower Body Dressing Assessment/Training   Comment, (Lower Body Dressing) Mod assist   Toileting   Comment, (Toileting) CGA   Clinical Impression   Criteria for Skilled Therapeutic Interventions Met (OT) Yes, treatment indicated   OT Diagnosis Decline in ADL independence and safety   Influenced by the following impairments LTKA   OT Problem List-Impairments  impacting ADL problems related to;activity tolerance impaired;range of motion (ROM);strength;pain;post-surgical precautions   Assessment of Occupational Performance 1-3 Performance Deficits   Identified Performance Deficits Impaired dressing bathign and toileting   Planned Therapy Interventions (OT) ADL retraining   Clinical Decision Making Complexity (OT) problem focused assessment/low complexity   Risk & Benefits of therapy have been explained evaluation/treatment results reviewed;care plan/treatment goals reviewed;risks/benefits reviewed;current/potential barriers reviewed;participants voiced agreement with care plan;participants included;patient;son   OT Total Evaluation Time   OT Eval, Low Complexity Minutes (52538) 9   Therapy Certification   Start of Care Date 05/07/25   Certification date from 05/07/25   Certification date to 05/07/25   Medical Diagnosis LTKA   OT Goals   Therapy Frequency (OT) One time eval and treatment   OT Predicted Duration/Target Date for Goal Attainment 05/07/25   OT Goals Lower Body Dressing;Lower Body Bathing;Toilet Transfer/Toileting   OT: Lower Body Dressing Modified independent;using adaptive equipment;within precautions;Goal Met   OT: Lower Body Bathing Modified independent;using adaptive equipment;with precautions;Goal Met   OT: Toilet Transfer/Toileting Modified independent;toilet transfer;cleaning and garment management;using adaptive equipment;within precautions;Goal Met   Self-Care/Home Management   Self-Care/Home Mgmt/ADL, Compensatory, Meal Prep Minutes (10519) 42   Symptoms Noted During/After Treatment (Meal Preparation/Planning Training) increased pain;fatigue   Treatment Detail/Skilled Intervention OT; Pt agreeable to therapy. Pt educated on LE dressing techniques including AE to improve LE dressing independence. After educaiton pt completed with SBA and cues for technique. PT completed sit to stand with cGA and cues for LE positioning. Pt mobilizes slowly due to pain.  Pt completed second sit to stand with CGA, ambulated to bathroom. Pt educated on showe r transfer technique with grab bars, pt completed with CGA on first attempt, SBA on second attempt. PT educated on toilet transfer techniques. AFter education pt completed with SBA and cues for LE psoitioning to improve tolerance. PT ambulated to chair. PT and caregiver educated on car transfer techniques with tutorial, both verbalized udnerstanding. PT and caregiver educated on frequency of mobility and positioning to improve edema and pain, both verbalized udnerstanding. Pt has no further OT concerns   OT Discharge Planning   OT Plan DC   OT Discharge Recommendation (DC Rec) other (see comments)  (defer to ortho MD)   OT Rationale for DC Rec Patient appears safe and able to discharge home with intermittent assist from family for higher level IADLS. Defer to ortho MD for further therapy recommendations   OT Brief overview of current status SBA shower transfer   OT Total Distance Amb During Session (feet) 50

## 2025-05-25 ENCOUNTER — HEALTH MAINTENANCE LETTER (OUTPATIENT)
Age: 75
End: 2025-05-25

## 2025-05-27 ENCOUNTER — DOCUMENTATION ONLY (OUTPATIENT)
Dept: OTHER | Facility: CLINIC | Age: 75
End: 2025-05-27
Payer: MEDICARE

## 2025-07-06 ENCOUNTER — HEALTH MAINTENANCE LETTER (OUTPATIENT)
Age: 75
End: 2025-07-06

## (undated) DEVICE — PACK TOTAL KNEE BOXED LATEX FREE PO15TKFCT

## (undated) DEVICE — TOURNIQUET SGL BLADDER 34"X4" PURPLE 5921-034-135

## (undated) DEVICE — BLADE SAW RECIP STRK 70X12.5X0.80MM 0277-096-277

## (undated) DEVICE — SOLUTION IV IRRIGATION 0.9% NACL 3L R8206

## (undated) DEVICE — VIAL DECANTER STERILE WHITE DYNJDEC06

## (undated) DEVICE — GLOVE PROTEXIS W/NEU-THERA 8.5  2D73TE85

## (undated) DEVICE — SET HANDPIECE INTERPULSE W/COAXIAL FAN SPRAY TIP 0210118000

## (undated) DEVICE — BAG CLEAR TRASH 1.3M 39X33" P4040C

## (undated) DEVICE — IMM KNEE 20"

## (undated) DEVICE — ESU PENCIL W/SMOKE EVAC CVPLP2000

## (undated) DEVICE — BONE CEMENT MIXEVAC III HI VAC KIT  0206-015-000

## (undated) DEVICE — BLADE SAW SAGITTAL STRK 25X90X1.27MM HD SYS 6 6125-127-090

## (undated) DEVICE — LINEN FULL SHEET 5511

## (undated) DEVICE — SU STRATAFIX PDS PLUS 1 CT-1 12" SXPP1A443

## (undated) DEVICE — CAST PADDING 6IN COTTON WEBRIL STERILE 2554

## (undated) DEVICE — SU VICRYL 1 CT-1 27" J341H

## (undated) DEVICE — Device

## (undated) DEVICE — GLOVE PROTEXIS BLUE W/NEU-THERA 7.5  2D73EB75

## (undated) DEVICE — SU STRATAFIX MONOCRYL 3-0 SPIRAL PS-1 45CM SXMP1B102

## (undated) DEVICE — DRAPE STERI TOWEL LG 1010

## (undated) DEVICE — TRAY CATH SURESTEP OD14 FR INTERMITTENT STER LTX INTS14

## (undated) DEVICE — SOLUTION IV 0.9% NACL 1000ML E8000

## (undated) DEVICE — GLOVE PROTEXIS BLUE W/NEU-THERA 8.5  2D73EB85

## (undated) DEVICE — SU DERMABOND PRINEO 22CM CLR222US

## (undated) DEVICE — LINEN ORTHO ACL PACK 5447

## (undated) DEVICE — DRAPE IOBAN INCISE 23X17" 6650EZ

## (undated) DEVICE — DRSG GAUZE 4X4" 8044

## (undated) DEVICE — GLOVE PROTEXIS POWDER FREE 8.5 ORTHOPEDIC 2D73ET85

## (undated) DEVICE — PREP CHLORAPREP 26ML TINTED HI-LITE ORANGE 930815

## (undated) DEVICE — BLADE SAW SAGITTAL STRK 13X90X1.27MM HD SYS 6 6113-127-090

## (undated) DEVICE — SU VICRYL 2-0 CT-1 27" UND J259H

## (undated) DEVICE — SUCTION MANIFOLD NEPTUNE 2 SYS 4 PORT 0702-020-000

## (undated) DEVICE — GLOVE BIOGEL PI SZ 7.5 40875

## (undated) RX ORDER — TRANEXAMIC ACID 650 MG/1
TABLET ORAL
Status: DISPENSED
Start: 2025-05-06

## (undated) RX ORDER — PROPOFOL 10 MG/ML
INJECTION, EMULSION INTRAVENOUS
Status: DISPENSED
Start: 2025-05-06

## (undated) RX ORDER — HYDRALAZINE HYDROCHLORIDE 20 MG/ML
INJECTION INTRAMUSCULAR; INTRAVENOUS
Status: DISPENSED
Start: 2025-05-06

## (undated) RX ORDER — LIDOCAINE HYDROCHLORIDE 10 MG/ML
INJECTION, SOLUTION EPIDURAL; INFILTRATION; INTRACAUDAL; PERINEURAL
Status: DISPENSED
Start: 2025-05-06

## (undated) RX ORDER — CEFAZOLIN SODIUM/WATER 2 G/20 ML
SYRINGE (ML) INTRAVENOUS
Status: DISPENSED
Start: 2025-05-06

## (undated) RX ORDER — ONDANSETRON 2 MG/ML
INJECTION INTRAMUSCULAR; INTRAVENOUS
Status: DISPENSED
Start: 2025-05-06

## (undated) RX ORDER — VANCOMYCIN HYDROCHLORIDE 1 G/20ML
INJECTION, POWDER, LYOPHILIZED, FOR SOLUTION INTRAVENOUS
Status: DISPENSED
Start: 2025-05-06

## (undated) RX ORDER — FENTANYL CITRATE-0.9 % NACL/PF 10 MCG/ML
PLASTIC BAG, INJECTION (ML) INTRAVENOUS
Status: DISPENSED
Start: 2025-05-06

## (undated) RX ORDER — FENTANYL CITRATE 50 UG/ML
INJECTION, SOLUTION INTRAMUSCULAR; INTRAVENOUS
Status: DISPENSED
Start: 2025-05-06

## (undated) RX ORDER — DEXAMETHASONE SODIUM PHOSPHATE 4 MG/ML
INJECTION, SOLUTION INTRA-ARTICULAR; INTRALESIONAL; INTRAMUSCULAR; INTRAVENOUS; SOFT TISSUE
Status: DISPENSED
Start: 2025-05-06

## (undated) RX ORDER — METOPROLOL TARTRATE 1 MG/ML
INJECTION, SOLUTION INTRAVENOUS
Status: DISPENSED
Start: 2025-05-06